# Patient Record
Sex: MALE | Race: WHITE
[De-identification: names, ages, dates, MRNs, and addresses within clinical notes are randomized per-mention and may not be internally consistent; named-entity substitution may affect disease eponyms.]

---

## 2020-09-09 ENCOUNTER — RESULT REVIEW (OUTPATIENT)
Age: 50
End: 2020-09-09

## 2020-12-14 PROBLEM — Z00.00 ENCOUNTER FOR PREVENTIVE HEALTH EXAMINATION: Status: ACTIVE | Noted: 2020-12-14

## 2020-12-15 ENCOUNTER — APPOINTMENT (OUTPATIENT)
Dept: UROLOGY | Facility: CLINIC | Age: 50
End: 2020-12-15
Payer: MEDICAID

## 2020-12-15 VITALS
HEIGHT: 68 IN | DIASTOLIC BLOOD PRESSURE: 91 MMHG | BODY MASS INDEX: 28.79 KG/M2 | WEIGHT: 190 LBS | SYSTOLIC BLOOD PRESSURE: 153 MMHG | TEMPERATURE: 97.7 F | HEART RATE: 86 BPM

## 2020-12-15 DIAGNOSIS — Z78.9 OTHER SPECIFIED HEALTH STATUS: ICD-10-CM

## 2020-12-15 DIAGNOSIS — N50.3 CYST OF EPIDIDYMIS: ICD-10-CM

## 2020-12-15 DIAGNOSIS — Z80.43 FAMILY HISTORY OF MALIGNANT NEOPLASM OF TESTIS: ICD-10-CM

## 2020-12-15 LAB
BILIRUB UR QL STRIP: NEGATIVE
CLARITY UR: CLEAR
COLLECTION METHOD: NORMAL
GLUCOSE UR-MCNC: NEGATIVE
HCG UR QL: 0.2 EU/DL
HGB UR QL STRIP.AUTO: NEGATIVE
KETONES UR-MCNC: NEGATIVE
LEUKOCYTE ESTERASE UR QL STRIP: NEGATIVE
NITRITE UR QL STRIP: NEGATIVE
PH UR STRIP: 6
PROT UR STRIP-MCNC: NEGATIVE
SP GR UR STRIP: <=1.005

## 2020-12-15 PROCEDURE — 99072 ADDL SUPL MATRL&STAF TM PHE: CPT

## 2020-12-15 PROCEDURE — 51798 US URINE CAPACITY MEASURE: CPT

## 2020-12-15 PROCEDURE — 99204 OFFICE O/P NEW MOD 45 MIN: CPT | Mod: 25

## 2020-12-15 PROCEDURE — 81003 URINALYSIS AUTO W/O SCOPE: CPT | Mod: QW

## 2020-12-15 NOTE — LETTER BODY
[Dear  ___] : Dear  [unfilled], [Consult Letter:] : I had the pleasure of evaluating your patient, [unfilled]. [( Thank you for referring [unfilled] for consultation for _____ )] : Thank you for referring [unfilled] for consultation for [unfilled] [Please see my note below.] : Please see my note below. [Consult Closing:] : Thank you very much for allowing me to participate in the care of this patient.  If you have any questions, please do not hesitate to contact me. [Sincerely,] : Sincerely, [FreeTextEntry3] : Aubrey Eller MD\par  of Urology\par [facility]\par \par Offices:\par [facility] @\par [address], [place] [address]\par and\par [address], [place] [address], [address]\par and\par [address] [address]\par \par TEL: [phone]\par FAX: [phone]

## 2020-12-15 NOTE — ASSESSMENT
[FreeTextEntry1] : Reviewed outside records. \par Right moderate complex Hydrocele and left epididymal cyst. \par \par Hydrocele:\par Discussed treatment options- continued observation, aspiration and surgical repair. Risks and benefits of each were discussed. Will like to observe. \par \par Epididymal cyst:\par Discussed that an epididymal cyst is a fluid-filled sac which grows at the top end of the testicle. It is benign and not malignant. Observation is usually used for simple, small asymptomatic spermatoceles and surgery for complex, large symptomatic ones. \par When symptomatic recommend:\par Scrotal support.\par NSAIDs PRN. \par \par Benign Prostatic Hyperplasia:\par PVR: 169 ml. \par Discussed that he has significant post void residual probably are secondary to enlarged prostate and that I would like to address those first with alpha blockers. Patient wants to hold off for now.\par \par Return to office in 2 months or sooner if any issues- will do Uroflo/PVR. \par

## 2020-12-15 NOTE — HISTORY OF PRESENT ILLNESS
[FreeTextEntry1] : 51 yo male presents for Hydrocele. \par Noticed right scrotal swelling for last 2 months, has off and on pain/discomfort. \par Had Scrotal Ultrasound: Hydrocele. Father had testicular cancer. \par Had pelvic CT scan yesterday. \par Reports reasonable stream, urinates every few hours or so during the day. Nocturia of 1 x. \par Denies hesitancy, straining, intermittency, urgency, incontinence, sense of incomplete emptying.\par Denies dysuria, hematuria, lower abdominal or flank pain, fever, chills or rigors.\par Normal erections and libido. \par No family history of Prostate cancer. \par

## 2020-12-15 NOTE — PHYSICAL EXAM
[General Appearance - Well Developed] : well developed [Normal Appearance] : normal appearance [General Appearance - In No Acute Distress] : no acute distress [] : no respiratory distress [Abdomen Soft] : soft [Abdomen Tenderness] : non-tender [Costovertebral Angle Tenderness] : no ~M costovertebral angle tenderness [Urethral Meatus] : meatus normal [Penis Abnormality] : normal circumcised penis [Prostate Tenderness] : the prostate was not tender [No Prostate Nodules] : no prostate nodules [Prostate Size ___ (0-4)] : prostate size [unfilled] (scale: 0-4) [FreeTextEntry1] : right hydrocele and left epididymal cyst  [Normal Station and Gait] : the gait and station were normal for the patient's age [Skin Color & Pigmentation] : normal skin color and pigmentation [No Focal Deficits] : no focal deficits [Oriented To Time, Place, And Person] : oriented to person, place, and time

## 2020-12-16 ENCOUNTER — NON-APPOINTMENT (OUTPATIENT)
Age: 50
End: 2020-12-16

## 2020-12-17 ENCOUNTER — NON-APPOINTMENT (OUTPATIENT)
Age: 50
End: 2020-12-17

## 2021-01-15 ENCOUNTER — APPOINTMENT (OUTPATIENT)
Dept: SURGERY | Facility: CLINIC | Age: 51
End: 2021-01-15
Payer: MEDICAID

## 2021-01-15 VITALS
HEIGHT: 67 IN | HEART RATE: 81 BPM | SYSTOLIC BLOOD PRESSURE: 137 MMHG | OXYGEN SATURATION: 97 % | RESPIRATION RATE: 16 BRPM | DIASTOLIC BLOOD PRESSURE: 86 MMHG | TEMPERATURE: 97.8 F | BODY MASS INDEX: 31.08 KG/M2 | WEIGHT: 198.03 LBS

## 2021-01-15 DIAGNOSIS — K40.90 UNILATERAL INGUINAL HERNIA, W/OUT OBSTRUCTION OR GANGRENE, NOT SPECIFIED AS RECURRENT: ICD-10-CM

## 2021-01-15 DIAGNOSIS — Z87.39 PERSONAL HISTORY OF OTHER DISEASES OF THE MUSCULOSKELETAL SYSTEM AND CONNECTIVE TISSUE: ICD-10-CM

## 2021-01-15 DIAGNOSIS — Z82.61 FAMILY HISTORY OF ARTHRITIS: ICD-10-CM

## 2021-01-15 DIAGNOSIS — Z80.43 FAMILY HISTORY OF MALIGNANT NEOPLASM OF TESTIS: ICD-10-CM

## 2021-01-15 PROCEDURE — 99204 OFFICE O/P NEW MOD 45 MIN: CPT

## 2021-01-15 PROCEDURE — 99072 ADDL SUPL MATRL&STAF TM PHE: CPT

## 2021-01-15 RX ORDER — IBUPROFEN 200 MG/1
200 TABLET ORAL
Refills: 0 | Status: ACTIVE | COMMUNITY

## 2021-01-15 NOTE — PHYSICAL EXAM
[JVD] : no jugular venous distention  [No Rash or Lesion] : No rash or lesion [Purpura] : no purpura  [Petechiae] : no petechiae [Skin Ulcer] : no ulcer [Skin Induration] : no induration [Alert] : alert [Oriented to Person] : oriented to person [Oriented to Place] : oriented to place [Oriented to Time] : oriented to time [Calm] : calm [de-identified] : non toxic, in no acute distress  [de-identified] : NC/AT PERRL EOMI no scleral icterus  [de-identified] : trachea midline, no gross mass  [de-identified] : no audible wheezing or stridor  [de-identified] : soft, non tender, no guarding, no rebound, no masses  [de-identified] : phallus normal, mild fullness without tenderness to the right testicle [de-identified] : FROM of all extremities with no gross deformity or angulation, there is no tenderness, no umbilical or left inguinal hernias, there is a reducible right inguinal hernia that does not appear to communicate with the right hydorcele [de-identified] : mood is calm

## 2021-01-15 NOTE — REASON FOR VISIT
[Consultation] : a consultation visit [FreeTextEntry1] : right groin pain and right inguinal hernia

## 2021-01-15 NOTE — HISTORY OF PRESENT ILLNESS
[de-identified] : The patient comes to the office in consultation by Dr. Aubrey Eller for evaluation of right groin pain and a right inguinal hernia with hydrocele.  The patient reports that he has been having intermittent discomfort and swelling in the right testicle.  The patient reports that more recently he has been having more pain in the right groin with a separate area of swelling.  He  has no abdominal pain, nausea, vomit, or changes in his bowel function.  The patient reports that he has been told he has a hydrocele based on ultrasound and on CT scan there was a right inguinal hernia.

## 2021-01-15 NOTE — DATA REVIEWED
[FreeTextEntry1] : Independent review of the scrotal ultrasound reveals a moderate complex right hydrocele and small let epididymal cyst. \par \par CT scan films not available for review

## 2021-01-15 NOTE — CONSULT LETTER
[Dear  ___] : Dear  [unfilled], [Consult Letter:] : I had the pleasure of evaluating your patient, [unfilled]. [Please see my note below.] : Please see my note below. [Consult Closing:] : Thank you very much for allowing me to participate in the care of this patient.  If you have any questions, please do not hesitate to contact me. [Sincerely,] : Sincerely, [FreeTextEntry3] : Tommy Marlow MD, FACS\par  \par Department of Surgery\par [facility]\par [facility]\par

## 2021-01-22 ENCOUNTER — APPOINTMENT (OUTPATIENT)
Dept: SURGERY | Facility: CLINIC | Age: 51
End: 2021-01-22

## 2021-02-12 ENCOUNTER — APPOINTMENT (OUTPATIENT)
Dept: SURGERY | Facility: CLINIC | Age: 51
End: 2021-02-12
Payer: MEDICAID

## 2021-02-12 PROCEDURE — 99214 OFFICE O/P EST MOD 30 MIN: CPT | Mod: 95

## 2021-02-12 NOTE — REASON FOR VISIT
[Home] : at home, [unfilled] , at the time of the visit. [Medical Office: ([facility])___] : at the medical office located in  [Verbal consent obtained from patient] : the patient, [unfilled] [Follow-Up: _____] : a [unfilled] follow-up visit

## 2021-02-12 NOTE — ASSESSMENT
[FreeTextEntry1] : The patient is a 50 year old male who was advised following review of the abd/pel CT scan films that he has a right hydrocele that appears to be non communicating with the right inguinal hernia.  He also has a smaller fat containing left inguinal hernia.  The patient has been advised that he will benefit from repair of the hernias.  The risks, benefits, and alternatives including the option of doing nothing to a robotic, possible, laparoscopic and possible open bilateral inguinal hernia repair with mesh were discussed.  The potential complications including but not limited to infection, bleeding, hernia recurrence, chronic post-operative pain, and seroma formation were discussed.  The patient was educated regarding the signs and symptoms of hernia strangulation and advised to seek immediate MD evaluation should this occur.  The patient understands and wishes to proceed at the next available time.\par

## 2021-02-16 ENCOUNTER — APPOINTMENT (OUTPATIENT)
Dept: UROLOGY | Facility: CLINIC | Age: 51
End: 2021-02-16

## 2021-02-23 ENCOUNTER — APPOINTMENT (OUTPATIENT)
Dept: UROLOGY | Facility: CLINIC | Age: 51
End: 2021-02-23
Payer: MEDICAID

## 2021-02-23 VITALS — TEMPERATURE: 97.7 F

## 2021-02-23 DIAGNOSIS — N13.8 BENIGN PROSTATIC HYPERPLASIA WITH LOWER URINARY TRACT SYMPMS: ICD-10-CM

## 2021-02-23 DIAGNOSIS — N40.1 BENIGN PROSTATIC HYPERPLASIA WITH LOWER URINARY TRACT SYMPMS: ICD-10-CM

## 2021-02-23 PROCEDURE — 51798 US URINE CAPACITY MEASURE: CPT | Mod: 59

## 2021-02-23 PROCEDURE — 99214 OFFICE O/P EST MOD 30 MIN: CPT | Mod: 25

## 2021-02-23 PROCEDURE — 51741 ELECTRO-UROFLOWMETRY FIRST: CPT

## 2021-02-23 PROCEDURE — 99072 ADDL SUPL MATRL&STAF TM PHE: CPT

## 2021-02-23 RX ORDER — TAMSULOSIN HYDROCHLORIDE 0.4 MG/1
0.4 CAPSULE ORAL
Qty: 30 | Refills: 3 | Status: ACTIVE | COMMUNITY
Start: 2021-02-23 | End: 1900-01-01

## 2021-02-25 NOTE — HISTORY OF PRESENT ILLNESS
[FreeTextEntry1] : 49 yo male presents for follow up. \par Has bilateral hernia, considering repair, saw Dr Marlow. Will like to observe hydrocele.\par Same urination. \par \par Initially seen for Hydrocele. Found to have elevated PVR. \par Noticed right scrotal swelling, had off and on pain/discomfort. \par Had Scrotal Ultrasound: Hydrocele. Father had testicular cancer. \par Had pelvic CT scan yesterday. \par Reported reasonable stream, urinates every few hours or so during the day. Nocturia of 1 x. \par Denied hesitancy, straining, intermittency, urgency, incontinence, sense of incomplete emptying.\par Denied dysuria, hematuria, lower abdominal or flank pain, fever, chills or rigors.\par Normal erections and libido. \par No family history of Prostate cancer. \par

## 2021-02-25 NOTE — PHYSICAL EXAM
[Normal Appearance] : normal appearance [General Appearance - In No Acute Distress] : no acute distress [Abdomen Soft] : soft [Abdomen Tenderness] : non-tender [Skin Color & Pigmentation] : normal skin color and pigmentation [FreeTextEntry1] : normal peripheral circulation  [] : no respiratory distress [Oriented To Time, Place, And Person] : oriented to person, place, and time [Normal Station and Gait] : the gait and station were normal for the patient's age

## 2021-02-25 NOTE — ASSESSMENT
[FreeTextEntry1] : Benign Prostatic Hyperplasia:\par See Uroflo and PVR. \par Prescribed Flomax. Explained common side effects: nasal congestion, cough, muscle pain, back pain, dizziness, orthostatic hypotension, somnolence, retrograde ejaculation, decreased libido and erectile dysfunction.\par  \par Hydrocele:\par Discussed treatment options- continued observation, aspiration and surgical repair. Risks and benefits of each were discussed. Pt wants to observe for now. \par \par Return to office in 4 weeks or sooner if any issues- will do Uroflo/PVR.

## 2021-02-25 NOTE — LETTER BODY
[Dear  ___] : Dear  [unfilled], [Courtesy Letter:] : I had the pleasure of seeing your patient, [unfilled], in my office today. [Please see my note below.] : Please see my note below. [Sincerely,] : Sincerely, [FreeTextEntry3] : Aubrey Eller MD\par  of Urology\par [facility]\par \par Offices:\par [facility] @\par [address], [place] [address]\par and\par [address], [place] [address], [address]\par and\par [address] [address]\par \par TEL: [phone]\par FAX: [phone]

## 2021-03-23 ENCOUNTER — APPOINTMENT (OUTPATIENT)
Dept: UROLOGY | Facility: CLINIC | Age: 51
End: 2021-03-23

## 2021-04-23 ENCOUNTER — OUTPATIENT (OUTPATIENT)
Dept: OUTPATIENT SERVICES | Facility: HOSPITAL | Age: 51
LOS: 1 days | End: 2021-04-23
Payer: MEDICAID

## 2021-04-23 VITALS
HEART RATE: 103 BPM | WEIGHT: 200.18 LBS | SYSTOLIC BLOOD PRESSURE: 140 MMHG | TEMPERATURE: 98 F | HEIGHT: 68 IN | DIASTOLIC BLOOD PRESSURE: 80 MMHG | RESPIRATION RATE: 16 BRPM

## 2021-04-23 DIAGNOSIS — Z98.890 OTHER SPECIFIED POSTPROCEDURAL STATES: Chronic | ICD-10-CM

## 2021-04-23 DIAGNOSIS — Z01.818 ENCOUNTER FOR OTHER PREPROCEDURAL EXAMINATION: ICD-10-CM

## 2021-04-23 DIAGNOSIS — Z29.9 ENCOUNTER FOR PROPHYLACTIC MEASURES, UNSPECIFIED: ICD-10-CM

## 2021-04-23 DIAGNOSIS — K40.20 BILATERAL INGUINAL HERNIA, WITHOUT OBSTRUCTION OR GANGRENE, NOT SPECIFIED AS RECURRENT: ICD-10-CM

## 2021-04-23 LAB
ALBUMIN SERPL ELPH-MCNC: 4.5 G/DL — SIGNIFICANT CHANGE UP (ref 3.3–5.2)
ALP SERPL-CCNC: 85 U/L — SIGNIFICANT CHANGE UP (ref 40–120)
ALT FLD-CCNC: 23 U/L — SIGNIFICANT CHANGE UP
ANION GAP SERPL CALC-SCNC: 9 MMOL/L — SIGNIFICANT CHANGE UP (ref 5–17)
APTT BLD: 29.8 SEC — SIGNIFICANT CHANGE UP (ref 27.5–35.5)
AST SERPL-CCNC: 21 U/L — SIGNIFICANT CHANGE UP
BASOPHILS # BLD AUTO: 0.04 K/UL — SIGNIFICANT CHANGE UP (ref 0–0.2)
BASOPHILS NFR BLD AUTO: 0.6 % — SIGNIFICANT CHANGE UP (ref 0–2)
BILIRUB SERPL-MCNC: 0.4 MG/DL — SIGNIFICANT CHANGE UP (ref 0.4–2)
BLD GP AB SCN SERPL QL: SIGNIFICANT CHANGE UP
BUN SERPL-MCNC: 20 MG/DL — SIGNIFICANT CHANGE UP (ref 8–20)
CALCIUM SERPL-MCNC: 9.1 MG/DL — SIGNIFICANT CHANGE UP (ref 8.6–10.2)
CHLORIDE SERPL-SCNC: 105 MMOL/L — SIGNIFICANT CHANGE UP (ref 98–107)
CO2 SERPL-SCNC: 28 MMOL/L — SIGNIFICANT CHANGE UP (ref 22–29)
CREAT SERPL-MCNC: 0.87 MG/DL — SIGNIFICANT CHANGE UP (ref 0.5–1.3)
EOSINOPHIL # BLD AUTO: 0.17 K/UL — SIGNIFICANT CHANGE UP (ref 0–0.5)
EOSINOPHIL NFR BLD AUTO: 2.7 % — SIGNIFICANT CHANGE UP (ref 0–6)
GLUCOSE SERPL-MCNC: 89 MG/DL — SIGNIFICANT CHANGE UP (ref 70–99)
HCT VFR BLD CALC: 44.6 % — SIGNIFICANT CHANGE UP (ref 39–50)
HGB BLD-MCNC: 15.4 G/DL — SIGNIFICANT CHANGE UP (ref 13–17)
IMM GRANULOCYTES NFR BLD AUTO: 0.2 % — SIGNIFICANT CHANGE UP (ref 0–1.5)
INR BLD: 1.07 RATIO — SIGNIFICANT CHANGE UP (ref 0.88–1.16)
LYMPHOCYTES # BLD AUTO: 1.24 K/UL — SIGNIFICANT CHANGE UP (ref 1–3.3)
LYMPHOCYTES # BLD AUTO: 19.9 % — SIGNIFICANT CHANGE UP (ref 13–44)
MCHC RBC-ENTMCNC: 31.3 PG — SIGNIFICANT CHANGE UP (ref 27–34)
MCHC RBC-ENTMCNC: 34.5 GM/DL — SIGNIFICANT CHANGE UP (ref 32–36)
MCV RBC AUTO: 90.7 FL — SIGNIFICANT CHANGE UP (ref 80–100)
MONOCYTES # BLD AUTO: 0.73 K/UL — SIGNIFICANT CHANGE UP (ref 0–0.9)
MONOCYTES NFR BLD AUTO: 11.7 % — SIGNIFICANT CHANGE UP (ref 2–14)
NEUTROPHILS # BLD AUTO: 4.04 K/UL — SIGNIFICANT CHANGE UP (ref 1.8–7.4)
NEUTROPHILS NFR BLD AUTO: 64.9 % — SIGNIFICANT CHANGE UP (ref 43–77)
PLATELET # BLD AUTO: 202 K/UL — SIGNIFICANT CHANGE UP (ref 150–400)
POTASSIUM SERPL-MCNC: 4.7 MMOL/L — SIGNIFICANT CHANGE UP (ref 3.5–5.3)
POTASSIUM SERPL-SCNC: 4.7 MMOL/L — SIGNIFICANT CHANGE UP (ref 3.5–5.3)
PROT SERPL-MCNC: 6.6 G/DL — SIGNIFICANT CHANGE UP (ref 6.6–8.7)
PROTHROM AB SERPL-ACNC: 12.4 SEC — SIGNIFICANT CHANGE UP (ref 10.6–13.6)
RBC # BLD: 4.92 M/UL — SIGNIFICANT CHANGE UP (ref 4.2–5.8)
RBC # FLD: 11.7 % — SIGNIFICANT CHANGE UP (ref 10.3–14.5)
SODIUM SERPL-SCNC: 142 MMOL/L — SIGNIFICANT CHANGE UP (ref 135–145)
WBC # BLD: 6.23 K/UL — SIGNIFICANT CHANGE UP (ref 3.8–10.5)
WBC # FLD AUTO: 6.23 K/UL — SIGNIFICANT CHANGE UP (ref 3.8–10.5)

## 2021-04-23 PROCEDURE — 93010 ELECTROCARDIOGRAM REPORT: CPT

## 2021-04-23 PROCEDURE — 93005 ELECTROCARDIOGRAM TRACING: CPT

## 2021-04-23 PROCEDURE — G0463: CPT

## 2021-04-23 RX ORDER — CEFAZOLIN SODIUM 1 G
2000 VIAL (EA) INJECTION ONCE
Refills: 0 | Status: COMPLETED | OUTPATIENT
Start: 2021-05-05 | End: 2021-05-05

## 2021-04-23 NOTE — H&P PST ADULT - ATTENDING COMMENTS
The risks, benefits, and alternatives including the option of doing nothing to a robotic, possible laparoscopic and possible open bilateral inguinal hernia repair with mesh were discussed.  The potential complications including but not limited to infection, bleeding, recurrent herniation, possible bowel injury and/or obstruction, seroma and/or hematoma, and possible chronic post operative pain were discussed.  The patient admitted understanding and agrees to proceed as planned.

## 2021-04-23 NOTE — H&P PST ADULT - NSICDXPASTMEDICALHX_GEN_ALL_CORE_FT
PAST MEDICAL HISTORY:  Bi inguinal hernia, w/o obst or gangrene, not spcf as recur      PAST MEDICAL HISTORY:  Bi inguinal hernia, w/o obst or gangrene, not spcf as recur     BPH (benign prostatic hyperplasia)     Right hydrocele

## 2021-04-23 NOTE — H&P PST ADULT - ASSESSMENT
49 yo M    OPIOID RISK TOOL    MAURICIO EACH BOX THAT APPLIES AND ADD TOTALS AT THE END    FAMILY HISTORY OF SUBSTANCE ABUSE                 FEMALE         MALE                                                Alcohol                             [  ]1 pt          [  ]3pts                                               Illegal Durgs                     [  ]2 pts        [  ]3pts                                               Rx Drugs                           [  ]4 pts        [  ]4 pts    PERSONAL HISTORY OF SUBSTANCE ABUSE                                                                                          Alcohol                             [  ]3 pts       [  ]3 pts                                               Illegal Drugs                     [  ]4 pts        [  ]4 pts                                               Rx Drugs                           [  ]5 pts        [  ]5 pts    AGE BETWEEN 16-45 YEARS                                      [  ]1 pt         [  ]1 pt    HISTORY OF PREADOLESCENT   SEXUAL ABUSE                                                             [  ]3 pts        [  ]0pts    PSYCHOLOGICAL DISEASE                     ADD, OCD, Bipolar, Schizophrenia        [  ]2 pts         [  ]2 pts                      Depression                                               [  ]1 pt           [  ]1 pt           SCORING TOTAL   (add numbers and type here)              ( 0 )                                     A score of 3 or lower indicated LOW risk for future opioid abuse  A score of 4 to 7 indicated moderate risk for future opioid abuse  A score of 8 or higher indicates a high risk for opioid abuse      CAPRINI VTE 2.0 SCORE [CLOT updated 2019]    AGE RELATED RISK FACTORS                                                       MOBILITY RELATED FACTORS  [ x] Age 41-60 years                                            (1 Point)                    [ ] Bed rest                                                        (1 Point)  [ ] Age: 61-74 years                                           (2 Points)                  [ ] Plaster cast                                                   (2 Points)  [ ] Age= 75 years                                              (3 Points)                    [ ] Bed bound for more than 72 hours                 (2 Points)    DISEASE RELATED RISK FACTORS                                               GENDER SPECIFIC FACTORS  [ ] Edema in the lower extremities                       (1 Point)              [ ] Pregnancy                                                     (1 Point)  [ ] Varicose veins                                               (1 Point)                     [ ] Post-partum < 6 weeks                                   (1 Point)             [ x] BMI > 25 Kg/m2                                            (1 Point)                     [ ] Hormonal therapy  or oral contraception          (1 Point)                 [ ] Sepsis (in the previous month)                        (1 Point)               [ ] History of pregnancy complications                 (1 point)  [ ] Pneumonia or serious lung disease                                               [ ] Unexplained or recurrent                     (1 Point)           (in the previous month)                               (1 Point)  [ ] Abnormal pulmonary function test                     (1 Point)                 SURGERY RELATED RISK FACTORS  [ ] Acute myocardial infarction                              (1 Point)               [ ]  Section                                             (1 Point)  [ ] Congestive heart failure (in the previous month)  (1 Point)      [ ] Minor surgery                                                  (1 Point)   [ ] Inflammatory bowel disease                             (1 Point)               [ ] Arthroscopic surgery                                        (2 Points)  [ ] Central venous access                                      (2 Points)                [x ] General surgery lasting more than 45 minutes (2 points)  [ ] Malignancy- Present or previous                   (2 Points)                [ ] Elective arthroplasty                                         (5 points)    [ ] Stroke (in the previous month)                          (5 Points)                                                                                                                                                           HEMATOLOGY RELATED FACTORS                                                 TRAUMA RELATED RISK FACTORS  [ ] Prior episodes of VTE                                     (3 Points)                [ ] Fracture of the hip, pelvis, or leg                       (5 Points)  [ ] Positive family history for VTE                         (3 Points)             [ ] Acute spinal cord injury (in the previous month)  (5 Points)  [ ] Prothrombin 99270 A                                     (3 Points)               [ ] Paralysis  (less than 1 month)                             (5 Points)  [ ] Factor V Leiden                                             (3 Points)                  [ ] Multiple Trauma within 1 month                        (5 Points)  [ ] Lupus anticoagulants                                     (3 Points)                                                           [ ] Anticardiolipin antibodies                               (3 Points)                                                       [ ] High homocysteine in the blood                      (3 Points)                                             [ ] Other congenital or acquired thrombophilia      (3 Points)                                                [ ] Heparin induced thrombocytopenia                  (3 Points)                                     Total Score [     4     ] 49 yo M PMH of BPH, c/o bilateral inguinal hernias for 1 yr. Pt noticed right scrotal bulging approximately 1 yr ago and now reports that the bulging has increased. He reports occasional discomfort in the right groin. Scrotal ultrasound showed right hydrocele. Abd/pel CT scan showed a right hydrocele that appears to be non communicating with the right inguinal hernia, and a smaller fat containing left inguinal hernia. Pt denies fevers, chills, abdominal or flank pain, hesitancy, straining, urgency, incontinence, feeling of incomplete emptying, dysuria, hematuria, or any bowel issues. He reports reasonable stream, urinates every few hours or so during the day, nocturia x1/night. FHx of testicular cancer in father, denies FHx of prostate cancer. Preop assessment prior to robotic assisted bilateral inguinal hernia repair with mesh, possible laparoscopic, possible open w/Dr Marlow on     OPIOID RISK TOOL    MAURICIO EACH BOX THAT APPLIES AND ADD TOTALS AT THE END    FAMILY HISTORY OF SUBSTANCE ABUSE                 FEMALE         MALE                                                Alcohol                             [  ]1 pt          [  ]3pts                                               Illegal Durgs                     [  ]2 pts        [  ]3pts                                               Rx Drugs                           [  ]4 pts        [  ]4 pts    PERSONAL HISTORY OF SUBSTANCE ABUSE                                                                                          Alcohol                             [  ]3 pts       [  ]3 pts                                               Illegal Drugs                     [  ]4 pts        [  ]4 pts                                               Rx Drugs                           [  ]5 pts        [  ]5 pts    AGE BETWEEN 16-45 YEARS                                      [  ]1 pt         [  ]1 pt    HISTORY OF PREADOLESCENT   SEXUAL ABUSE                                                             [  ]3 pts        [  ]0pts    PSYCHOLOGICAL DISEASE                     ADD, OCD, Bipolar, Schizophrenia        [  ]2 pts         [  ]2 pts                      Depression                                               [  ]1 pt           [  ]1 pt           SCORING TOTAL   (add numbers and type here)              ( 0 )                                     A score of 3 or lower indicated LOW risk for future opioid abuse  A score of 4 to 7 indicated moderate risk for future opioid abuse  A score of 8 or higher indicates a high risk for opioid abuse      CAPRINI VTE 2.0 SCORE [CLOT updated 2019]    AGE RELATED RISK FACTORS                                                       MOBILITY RELATED FACTORS  [ x] Age 41-60 years                                            (1 Point)                    [ ] Bed rest                                                        (1 Point)  [ ] Age: 61-74 years                                           (2 Points)                  [ ] Plaster cast                                                   (2 Points)  [ ] Age= 75 years                                              (3 Points)                    [ ] Bed bound for more than 72 hours                 (2 Points)    DISEASE RELATED RISK FACTORS                                               GENDER SPECIFIC FACTORS  [ ] Edema in the lower extremities                       (1 Point)              [ ] Pregnancy                                                     (1 Point)  [ ] Varicose veins                                               (1 Point)                     [ ] Post-partum < 6 weeks                                   (1 Point)             [ x] BMI > 25 Kg/m2                                            (1 Point)                     [ ] Hormonal therapy  or oral contraception          (1 Point)                 [ ] Sepsis (in the previous month)                        (1 Point)               [ ] History of pregnancy complications                 (1 point)  [ ] Pneumonia or serious lung disease                                               [ ] Unexplained or recurrent                     (1 Point)           (in the previous month)                               (1 Point)  [ ] Abnormal pulmonary function test                     (1 Point)                 SURGERY RELATED RISK FACTORS  [ ] Acute myocardial infarction                              (1 Point)               [ ]  Section                                             (1 Point)  [ ] Congestive heart failure (in the previous month)  (1 Point)      [ ] Minor surgery                                                  (1 Point)   [ ] Inflammatory bowel disease                             (1 Point)               [ ] Arthroscopic surgery                                        (2 Points)  [ ] Central venous access                                      (2 Points)                [x ] General surgery lasting more than 45 minutes (2 points)  [ ] Malignancy- Present or previous                   (2 Points)                [ ] Elective arthroplasty                                         (5 points)    [ ] Stroke (in the previous month)                          (5 Points)                                                                                                                                                           HEMATOLOGY RELATED FACTORS                                                 TRAUMA RELATED RISK FACTORS  [ ] Prior episodes of VTE                                     (3 Points)                [ ] Fracture of the hip, pelvis, or leg                       (5 Points)  [ ] Positive family history for VTE                         (3 Points)             [ ] Acute spinal cord injury (in the previous month)  (5 Points)  [ ] Prothrombin 87701 A                                     (3 Points)               [ ] Paralysis  (less than 1 month)                             (5 Points)  [ ] Factor V Leiden                                             (3 Points)                  [ ] Multiple Trauma within 1 month                        (5 Points)  [ ] Lupus anticoagulants                                     (3 Points)                                                           [ ] Anticardiolipin antibodies                               (3 Points)                                                       [ ] High homocysteine in the blood                      (3 Points)                                             [ ] Other congenital or acquired thrombophilia      (3 Points)                                                [ ] Heparin induced thrombocytopenia                  (3 Points)                                     Total Score [     4     ]

## 2021-04-23 NOTE — H&P PST ADULT - HISTORY OF PRESENT ILLNESS
49 yo M c/o bilateral groin hernias for 1 yr, right worse>left 51 yo M c/o bilateral groin hernias for 1 yr, right worse>lHas bilateral hernia, considering repair, saw Dr Marlow. Will like to observe hydrocele.  Same urination.     Initially seen for Hydrocele. Found to have elevated PVR.   Noticed right scrotal swelling, had off and on pain/discomfort.   Had Scrotal Ultrasound: Hydrocele. Father had testicular cancer.   Had pelvic CT scan yesterday.   Reported reasonable stream, urinates every few hours or so during the day. Nocturia of 1 x.   Denied hesitancy, straining, intermittency, urgency, incontinence, sense of incomplete emptying.  Denied dysuria, hematuria, lower abdominal or flank pain, fever, chills or rigors.  Normal erections and libido.   No family history of Prostate cancer.  51 yo M c/o bilateral groin hernias for 1 yr.   50 year old male being seen for a right inguinal hernia and right hydrocele follow-up visit.     Active Problems  Bilateral inguinal hernia (550.92) (K40.20)  BPH with obstruction/lower urinary tract symptoms (600.01,599.69) (N40.1,N13.8)  Epididymal cyst (608.    Has bilateral hernia, considering repair, saw Dr Marlow. Will like to observe hydrocele.  Same urination.     Initially seen for Hydrocele. Found to have elevated PVR.   Noticed right scrotal swelling, had off and on pain/discomfort.   Had Scrotal Ultrasound: Hydrocele. Father had testicular cancer.   Had pelvic CT scan yesterday.   Reported reasonable stream, urinates every few hours or so during the day. Nocturia of 1 x.   Denied hesitancy, straining, intermittency, urgency, incontinence, sense of incomplete emptying.  Denied dysuria, hematuria, lower abdominal or flank pain, fever, chills or rigors.  Normal erections and libido.   No family history of Prostate cancer.  51 yo M PMH of BPH, c/o bilateral inguinal hernias for 1 yr. Pt noticed right scrotal swelling approx 1 yr ago, has occasional discomfort. Scrotal ultrasound showed right hydrocele. Pelvic CT scan showed. Pt denies hesitancy, straining, urgency, incontinence, or feeling of incomplete emptying, dysuria, hematuria, lower abdominal or flank pain, fever, chills or rigors. He reports reasonable stream, urinates every few hours or so during the day, nocturia x1/night. FHx of testicular cancer in father, denies FHx of prostate cancer. Preop assessment prior to robotic assisted bilateral inguinal hernia repair with mesh, possible laparoscopic, possible open w/Dr Marlow on 5/5   51 yo M PMH of BPH, c/o bilateral inguinal hernias for 1 yr. Pt noticed right scrotal bulging approximately 1 yr ago and now reports that the bulging has increased. He reports occasional discomfort in the right groin. Scrotal ultrasound showed right hydrocele. Abd/pel CT scan showed a right hydrocele that appears to be non communicating with the right inguinal hernia, and a smaller fat containing left inguinal hernia. Pt denies fevers, chills, abdominal or flank pain, hesitancy, straining, urgency, incontinence, feeling of incomplete emptying, dysuria, hematuria, or any bowel issues. He reports reasonable stream, urinates every few hours or so during the day, nocturia x1/night. FHx of testicular cancer in father, denies FHx of prostate cancer. Preop assessment prior to robotic assisted bilateral inguinal hernia repair with mesh, possible laparoscopic, possible open w/Dr Marlow on 5/5

## 2021-04-23 NOTE — H&P PST ADULT - NSICDXPROBLEM_GEN_ALL_CORE_FT
PROBLEM DIAGNOSES  Problem: Bi inguinal hernia, w/o obst or gangrene, not spcf as recur  Assessment and Plan: preop assessment, bilateral inguinal hernia repair with mesh on 5/5    Problem: Need for prophylactic measure  Assessment and Plan: caprini score 4, moderate risk for dvt, SCD ordered, surgical team to assess for dvt prophylaxis

## 2021-04-23 NOTE — H&P PST ADULT - NEGATIVE GENERAL GENITOURINARY SYMPTOMS
no hematuria/no flank pain L/no flank pain R/no bladder infections/no incontinence/no dysuria/no urinary hesitancy/normal urinary frequency

## 2021-04-23 NOTE — H&P PST ADULT - GIT ABD PE PAL DETAILS PC
b/l inguinal reducible hernias, right>left/mass palpable right inguinal reducible hernia/mass palpable

## 2021-04-23 NOTE — H&P PST ADULT - LAB RESULTS AND INTERPRETATION
4/26: +MSSA, surgeon's office (Artie) made aware, mupirocin ordered, will educate patient on its use. Nicolette Marte NP 4/26: +MSSA, surgeon's office (Artie) made aware, mupirocin ordered, patient was educated on its use. Nicolette Marte NP

## 2021-04-23 NOTE — H&P PST ADULT - NSICDXFAMILYHX_GEN_ALL_CORE_FT
FAMILY HISTORY:  Mother  Still living? Yes, Estimated age: Age Unknown  FH: type 2 diabetes, Age at diagnosis: Age Unknown     FAMILY HISTORY:  Father  Still living? Unknown  FH: testicular cancer, Age at diagnosis: Age Unknown    Mother  Still living? Yes, Estimated age: Age Unknown  FH: type 2 diabetes, Age at diagnosis: Age Unknown

## 2021-04-23 NOTE — H&P PST ADULT - OTHER CARE PROVIDERS
PCP Dr Timothy Martinez [facility] PCP Dr Alana Martinez [facility]

## 2021-04-24 LAB
MRSA PCR RESULT.: SIGNIFICANT CHANGE UP
S AUREUS DNA NOSE QL NAA+PROBE: DETECTED

## 2021-04-25 PROBLEM — N40.0 BENIGN PROSTATIC HYPERPLASIA WITHOUT LOWER URINARY TRACT SYMPTOMS: Chronic | Status: ACTIVE | Noted: 2021-04-23

## 2021-04-25 PROBLEM — K40.20 BILATERAL INGUINAL HERNIA, WITHOUT OBSTRUCTION OR GANGRENE, NOT SPECIFIED AS RECURRENT: Chronic | Status: ACTIVE | Noted: 2021-04-23

## 2021-04-25 PROBLEM — N43.3 HYDROCELE, UNSPECIFIED: Chronic | Status: ACTIVE | Noted: 2021-04-23

## 2021-05-01 DIAGNOSIS — Z01.818 ENCOUNTER FOR OTHER PREPROCEDURAL EXAMINATION: ICD-10-CM

## 2021-05-02 ENCOUNTER — APPOINTMENT (OUTPATIENT)
Dept: DISASTER EMERGENCY | Facility: CLINIC | Age: 51
End: 2021-05-02

## 2021-05-02 LAB — SARS-COV-2 N GENE NPH QL NAA+PROBE: NOT DETECTED

## 2021-05-04 ENCOUNTER — TRANSCRIPTION ENCOUNTER (OUTPATIENT)
Age: 51
End: 2021-05-04

## 2021-05-05 ENCOUNTER — OUTPATIENT (OUTPATIENT)
Dept: INPATIENT UNIT | Facility: HOSPITAL | Age: 51
LOS: 1 days | End: 2021-05-05
Payer: MEDICAID

## 2021-05-05 ENCOUNTER — APPOINTMENT (OUTPATIENT)
Dept: SURGERY | Facility: HOSPITAL | Age: 51
End: 2021-05-05

## 2021-05-05 VITALS
HEART RATE: 69 BPM | WEIGHT: 200.18 LBS | TEMPERATURE: 98 F | SYSTOLIC BLOOD PRESSURE: 139 MMHG | DIASTOLIC BLOOD PRESSURE: 90 MMHG | OXYGEN SATURATION: 99 % | RESPIRATION RATE: 20 BRPM | HEIGHT: 68 IN

## 2021-05-05 VITALS
OXYGEN SATURATION: 99 % | RESPIRATION RATE: 15 BRPM | DIASTOLIC BLOOD PRESSURE: 85 MMHG | HEART RATE: 61 BPM | SYSTOLIC BLOOD PRESSURE: 127 MMHG

## 2021-05-05 DIAGNOSIS — Z98.890 OTHER SPECIFIED POSTPROCEDURAL STATES: Chronic | ICD-10-CM

## 2021-05-05 DIAGNOSIS — K40.20 BILATERAL INGUINAL HERNIA, WITHOUT OBSTRUCTION OR GANGRENE, NOT SPECIFIED AS RECURRENT: ICD-10-CM

## 2021-05-05 LAB — GLUCOSE BLDC GLUCOMTR-MCNC: 105 MG/DL — HIGH (ref 70–99)

## 2021-05-05 PROCEDURE — S2900: CPT

## 2021-05-05 PROCEDURE — 49650 LAP ING HERNIA REPAIR INIT: CPT | Mod: 82,50

## 2021-05-05 PROCEDURE — 82962 GLUCOSE BLOOD TEST: CPT

## 2021-05-05 PROCEDURE — 49650 LAP ING HERNIA REPAIR INIT: CPT | Mod: 50

## 2021-05-05 PROCEDURE — S2900 ROBOTIC SURGICAL SYSTEM: CPT | Mod: NC

## 2021-05-05 PROCEDURE — C1781: CPT

## 2021-05-05 RX ORDER — CELECOXIB 200 MG/1
1 CAPSULE ORAL
Qty: 14 | Refills: 0
Start: 2021-05-05 | End: 2021-05-11

## 2021-05-05 RX ORDER — SODIUM CHLORIDE 9 MG/ML
3 INJECTION INTRAMUSCULAR; INTRAVENOUS; SUBCUTANEOUS ONCE
Refills: 0 | Status: COMPLETED | OUTPATIENT
Start: 2021-05-05 | End: 2021-05-05

## 2021-05-05 RX ORDER — ONDANSETRON 8 MG/1
4 TABLET, FILM COATED ORAL ONCE
Refills: 0 | Status: DISCONTINUED | OUTPATIENT
Start: 2021-05-05 | End: 2021-05-05

## 2021-05-05 RX ORDER — SODIUM CHLORIDE 9 MG/ML
1000 INJECTION, SOLUTION INTRAVENOUS
Refills: 0 | Status: DISCONTINUED | OUTPATIENT
Start: 2021-05-05 | End: 2021-05-05

## 2021-05-05 RX ORDER — HYDROMORPHONE HYDROCHLORIDE 2 MG/ML
0.5 INJECTION INTRAMUSCULAR; INTRAVENOUS; SUBCUTANEOUS
Refills: 0 | Status: DISCONTINUED | OUTPATIENT
Start: 2021-05-05 | End: 2021-05-05

## 2021-05-05 RX ORDER — BUPIVACAINE 13.3 MG/ML
20 INJECTION, SUSPENSION, LIPOSOMAL INFILTRATION ONCE
Refills: 0 | Status: DISCONTINUED | OUTPATIENT
Start: 2021-05-05 | End: 2021-05-05

## 2021-05-05 RX ADMIN — Medication 100 MILLIGRAM(S): at 15:20

## 2021-05-05 RX ADMIN — SODIUM CHLORIDE 3 MILLILITER(S): 9 INJECTION INTRAMUSCULAR; INTRAVENOUS; SUBCUTANEOUS at 13:28

## 2021-05-05 NOTE — ASU DISCHARGE PLAN (ADULT/PEDIATRIC) - ASU DC SPECIAL INSTRUCTIONSFT
-follow up in clinic with Dr. Marlow in 2 weeks  - no showering for 48 hours  - okay to shower after 48 hours with soap and water - do not submerge  - for pain, take over the counter tylenol as instructed on packaging  - for breakthrough pain, take celebrex as prescribed -follow up in clinic with Dr. Marlow in 2 weeks  - no showering for 48 hours  - okay to shower after 48 hours with soap and water - do not submerge  - for pain, take over the counter Advil 200 mg (1 tab) after breakfast, lunch, and dinner   - for breakthrough pain, take celebrex as prescribed

## 2021-05-05 NOTE — ASU DISCHARGE PLAN (ADULT/PEDIATRIC) - CARE PROVIDER_API CALL
Tommy Marlow (MD)  Surgery  [address], [address]st [place] [address]  Phone: [phone]  Fax: [phone]  Follow Up Time: 2 weeks

## 2021-05-05 NOTE — ASU DISCHARGE PLAN (ADULT/PEDIATRIC) - ACTIVITY LEVEL
No heavy lifting max lifting capacity 20 pounds/No excercise/No heavy lifting/No sports/gym/No tub baths/No intercourse

## 2021-05-05 NOTE — BRIEF OPERATIVE NOTE - OPERATION/FINDINGS
Robot assisted b/l Inguinal hernia repair with mesh placed in preperitoneal space. Peritoneum closed with running barbed, non absorbable suture.

## 2021-05-05 NOTE — ASU DISCHARGE PLAN (ADULT/PEDIATRIC) - COMMENTS
Please call the office at [phone] for an appointment for a post operative appointment for Monday May 10, 2021.

## 2021-05-10 ENCOUNTER — APPOINTMENT (OUTPATIENT)
Dept: SURGERY | Facility: CLINIC | Age: 51
End: 2021-05-10
Payer: MEDICAID

## 2021-05-10 VITALS
HEART RATE: 77 BPM | TEMPERATURE: 97.6 F | BODY MASS INDEX: 30.76 KG/M2 | SYSTOLIC BLOOD PRESSURE: 156 MMHG | RESPIRATION RATE: 16 BRPM | HEIGHT: 67 IN | DIASTOLIC BLOOD PRESSURE: 92 MMHG | OXYGEN SATURATION: 99 % | WEIGHT: 196 LBS

## 2021-05-10 PROCEDURE — 99024 POSTOP FOLLOW-UP VISIT: CPT

## 2021-05-10 NOTE — ASSESSMENT
[FreeTextEntry1] : The patient is a 50 year old prince who is stable and doing well following a robotic bilateral inguinal hernia repair with mesh.  The patient was advised to avoid heavy or strenuous lifting and to utilize Ibuprofen as an anti-inflammatory.  The patient will follow up in 2 weeks time or sooner should any problems or issues arise.

## 2021-05-10 NOTE — PHYSICAL EXAM
[JVD] : no jugular venous distention  [No Rash or Lesion] : No rash or lesion [Purpura] : no purpura  [Petechiae] : no petechiae [Skin Ulcer] : no ulcer [Skin Induration] : no induration [Alert] : alert [Oriented to Person] : oriented to person [Oriented to Place] : oriented to place [Oriented to Time] : oriented to time [Calm] : calm [de-identified] : non toxic, in no acute distress  [de-identified] : NC/AT PERRL EOMI no scleral icterus  [de-identified] : trachea midline, no gross mass  [de-identified] : no audible wheezing or stridor  [de-identified] : softly distended, no localizing tenderness, no guarding, no rebound, no masses  [de-identified] : phallus normal, mild fullness without tenderness to the right testicle [de-identified] : FROM of all extremities with no gross deformity or angulation, there is no tenderness, no umbilical no recurrent inguinal hernias and no seroma or hematoma [de-identified] : surgical incisions are without invasive infection, there is mild inflammation of the right upper and left upper incision sites  [de-identified] : mood is calm

## 2021-05-10 NOTE — HISTORY OF PRESENT ILLNESS
[de-identified] : The patient returns to the office with complaints of mild bloating and mild lower abdominal wall discomfort. He was doing some yard work over the weekend and reports that the pain became more prominent thereafter.   The patient reports that the right groin pain he had prior to surgery has resolved.  Overall he is pleased with his results thus far.

## 2021-05-24 ENCOUNTER — APPOINTMENT (OUTPATIENT)
Dept: SURGERY | Facility: CLINIC | Age: 51
End: 2021-05-24
Payer: MEDICAID

## 2021-05-24 VITALS
HEIGHT: 67 IN | WEIGHT: 202 LBS | HEART RATE: 76 BPM | SYSTOLIC BLOOD PRESSURE: 151 MMHG | TEMPERATURE: 97.9 F | DIASTOLIC BLOOD PRESSURE: 94 MMHG | OXYGEN SATURATION: 97 % | BODY MASS INDEX: 31.71 KG/M2

## 2021-05-24 VITALS — SYSTOLIC BLOOD PRESSURE: 151 MMHG | DIASTOLIC BLOOD PRESSURE: 94 MMHG

## 2021-05-24 DIAGNOSIS — K40.20 BILATERAL INGUINAL HERNIA, W/OUT OBSTRUCTION OR GANGRENE, NOT SPECIFIED AS RECURRENT: ICD-10-CM

## 2021-05-24 PROCEDURE — 99024 POSTOP FOLLOW-UP VISIT: CPT

## 2021-05-24 NOTE — PHYSICAL EXAM
[JVD] : no jugular venous distention  [No Rash or Lesion] : No rash or lesion [Purpura] : no purpura  [Petechiae] : no petechiae [Skin Ulcer] : no ulcer [Skin Induration] : no induration [Alert] : alert [Oriented to Person] : oriented to person [Oriented to Place] : oriented to place [Oriented to Time] : oriented to time [Calm] : calm [de-identified] : non toxic, in no acute distress  [de-identified] : NC/AT PERRL EOMI no scleral icterus  [de-identified] : trachea midline, no gross mass  [de-identified] : no audible wheezing or stridor  [de-identified] : phallus normal, mild fullness without tenderness to the right testicle [de-identified] : softly distended, no localizing tenderness, no guarding, no rebound, no masses  [de-identified] : surgical incisions are without invasive infection, there remains mild inflammation of the right upper site, the left has healed  [de-identified] : FROM of all extremities with no gross deformity or angulation, there is no tenderness, no umbilical no recurrent inguinal hernias and no seroma or hematoma [de-identified] : mood is calm

## 2021-05-24 NOTE — HISTORY OF PRESENT ILLNESS
[de-identified] : The patient returns to the office without complaints. He is very pleased thus far with his surgical results.

## 2021-05-24 NOTE — ASSESSMENT
[FreeTextEntry1] : The patient is stable and doing well following a robotic assisted bilateral inguinal hernia repair with mesh.  The patient will follow up in 12 months or sooner should any issues or problems arise.

## 2021-10-19 ENCOUNTER — APPOINTMENT (OUTPATIENT)
Dept: NEUROSURGERY | Facility: CLINIC | Age: 51
End: 2021-10-19
Payer: MEDICAID

## 2021-10-19 VITALS — BODY MASS INDEX: 31.07 KG/M2 | WEIGHT: 205 LBS | HEIGHT: 68 IN

## 2021-10-19 DIAGNOSIS — M54.12 RADICULOPATHY, CERVICAL REGION: ICD-10-CM

## 2021-10-19 PROCEDURE — 99203 OFFICE O/P NEW LOW 30 MIN: CPT

## 2021-10-21 PROBLEM — M54.12 RIGHT CERVICAL RADICULOPATHY: Status: ACTIVE | Noted: 2021-10-19

## 2021-10-21 NOTE — PHYSICAL EXAM
[General Appearance - Alert] : alert [General Appearance - In No Acute Distress] : in no acute distress [Oriented To Time, Place, And Person] : oriented to person, place, and time [Impaired Insight] : insight and judgment were intact [Affect] : the affect was normal [Person] : oriented to person [Place] : oriented to place [Time] : oriented to time [Short Term Intact] : short term memory intact [Fluency] : fluency intact [Comprehension] : comprehension intact [Cranial Nerves Optic (II)] : visual acuity intact bilaterally,  pupils equal round and reactive to light [Cranial Nerves Oculomotor (III)] : extraocular motion intact [Cranial Nerves Trigeminal (V)] : facial sensation intact symmetrically [Cranial Nerves Facial (VII)] : face symmetrical [Cranial Nerves Glossopharyngeal (IX)] : tongue and palate midline [Cranial Nerves Accessory (XI - Cranial And Spinal)] : head turning and shoulder shrug symmetric [Cranial Nerves Hypoglossal (XII)] : there was no tongue deviation with protrusion [Motor Tone] : muscle tone was normal in all four extremities [Motor Strength] : muscle strength was normal in all four extremities [Sensation Tactile Decrease] : light touch was intact [Sensation Pain / Temperature Decrease] : pain and temperature was intact [Abnormal Walk] : normal gait [Balance] : balance was intact [No Visual Abnormalities] : no visible abnormailities [Normal] : normal [Able to toe walk] : the patient was able to toe walk [Able to heel walk] : the patient was able to heel walk [Over the Past 2 Weeks, Have You Felt Down, Depressed, or Hopeless?] : 1.) Over the past 2 weeks, have you felt down, depressed, or hopeless? No [Over the Past 2 Weeks, Have You Felt Little Interest or Pleasure Doing Things?] : 2.) Over the past 2 weeks, have you felt little interest or pleasure doing things? No

## 2021-10-21 NOTE — HISTORY OF PRESENT ILLNESS
[Pain] : pain [Weakness] : weakness [Intermit.] : ~He/She~ states the symptoms seem to be intermittent [Bending] : worsened by bending [Lifting] : worsened by lifting [Recumbency] : relieved by recumbency [Rest] : relieved by rest [Loss of Dexterity] : loss of dexterity [FreeTextEntry1] : neck and RUE pain.  [de-identified] : AMI LOYOLA is a 51 year old male presents for initial neurosurgical evaluation of right cervical radiculopathy.  No significant medical history.  Past surgical history includes carpal tunnel surgery right hand  10 years ago. Patient mentions long standing history of neck pain dating back 10 + years.  Denies any trauma or injury.  Patient is a , operating power tools and heavy lifting.  Patient endorses neck/interscapular pain with radiation to right upper extremity mostly affecting 4th and 5 th digit.  No LUE pain. RUE > neck pain.    Pain intensity 6/10.  Denies any saddle anesthesia, urinary or bowel incontinence.\par Patient is ambulating independently. No balance or coordination issues.  Patient is exacerbated by bending, twisting and lifting.  It is improved with modification of activity.   He has not tried any physical therapy or pain management as of yet.  No recent UE EMG/NCS \par He is managing his symptoms with cyclobenzaprine and NSAIDS which provides minimal relief. \par  [Ataxia] : no ataxia [Incontinence] : no incontinence [Urinary Ret.] : no urinary retention

## 2021-10-21 NOTE — REVIEW OF SYSTEMS
[As Noted in HPI] : as noted in HPI [Numbness] : numbness [Tingling] : tingling [Negative] : Heme/Lymph [Difficulty Walking] : no difficulty walking [FreeTextEntry9] : neck pain

## 2021-10-21 NOTE — REASON FOR VISIT
[New Patient Visit] : a new patient visit [Referred By: _________] : Patient was referred by SAMANTA [FreeTextEntry1] : right cervical radiculopathy  cervical spinal stenosis

## 2021-10-21 NOTE — ASSESSMENT
ESTÃ PROGRAMADO PARA UN EGD CON: Francesca Pickard MD    DÃ­a de la socorro: [phone]  LocalizaciÃ³n:  [address]  [address] [facility] [place] [address] [facility]    NÃºmero de telÃ©fono: [phone] para reprogramar  [phone] si tiene preguntas sobre la preparaciÃ³n o el procedimiento  [phone] despuÃ©s de las 5 p.m. para llamadas de emergencia    Hora de llegada: Las preadmisiones lo llamarÃ¡n karen o dos dÃ­as antes de la fecha de [place] procedimiento con mandujano hora de llegada. La prueba de COVID 19 estÃ¡ programada para las  [place] [address] [facility] [address] el [phone] a las 10am. Esta es monica prueba de laboratorio programada, asÃ­ que llegue 15 minutos antes de la hora indicada anteriormente. CUARENTENA EN MANDUJANO CASA HASTA DESPUÃS DEL [facility]. Si esto no es posible, le recomendamos que use Melissa Hutch, distancia social y practique monica buena higiene de lavado de alvarez antes de mandujano procedimiento. 1.La noche anterior a mandujano prueba, no ingiera nada sÃ³lido despuÃ©s de la medianoche, incluyendo antiÃ¡cidos, natali puede jn lÃ­quidos james hasta 4 horas antes de la hora de llegada luego, no toan nada. Entre los lÃ­quidos james se encuentran: a. Refrescos (orange, ginger-all, cola, Sprite. 7-Up, etc.). Gatorade, Michael-Aid, jugos de frutas colados sin pulpa (manzana, uva sabrina, naranja, limonada, etc.), agua, tÃ©, cafÃ© (sin leche o crema no lÃ¡ctea), consomÃ©/caldo bajo en sal, caramelos duros, paletas (no sorbete ni paletas con fruta), gelatina colin(limÃ³n, lima, naranja: sin cubierta de frutas). b.NO COMA ALIMENTOS SÃLIDOS. c.NO TOME BEBIDAS ALCOHÃLICAS. 2.No consuma gomas de mascar, mentas ni caramelos duros (4) horas antes de mandujano llegada. 3.Si por lo general bert medicamentos para el corazÃ³n o la presiÃ³n arterial en la maÃ±agustin, tÃ³melos en la maÃ±agustin de mandujano procedimiento con un pequeÃ±o sorbo de agua al menos (2) horas antes de mandujano llegada programada. 4. PÃ­walt a un amigo o familiar que lo lleve de [FreeTextEntry1] : Mr. Cuello presents with above history and imaging.  Patient is non focal, no neurological deficits noted on exam.  Patient has MRI cervical spine imaging which reveals central C3- C4 foraminal stenosis, left C5- C6 foraminal stenosis and right C6- C7 foraminal stenosis.  No severe cord compression.\par Patient has not exhausted any conservative measures as of yet.\par \par Antiinflammatory was recommended for management of symptoms and pain. Patient has been referred to physical therapy for strengthening and to decrease pain modalities and core strengthening.  We discussed the benefits of alternating heat, ice  and Icy Hot Cream.  Patient  may try gentle stretches at home in the interim.  Patient will follow up in 4-6 weeks for a formal clinical assessment and evaluate recovery.\par \par I, Dr. Osmar Chavez, personally performed the evaluation and management (E/M) services for this patient.  That E/M includes assessment of the initial examination, assessing the pertinent medical and surgical history, and establishing the plan of care.  At the time of the visit, my ACP, Jamila Cm, actively participated in the evaluation and management services for this patient to be followed going forward in accordance with the plan documented in the clinical note.\par \par \par  regreso a casa. La sedaciÃ³n no le permitirÃ¡ manejar hans aproximadamente 24 horas despuÃ©s de mandujano procedimientoohasta que tenga monica noche de descanso reparador. No podrÃ¡ utilizar el transporte pÃºblico.    5.Instrucciones especiales: 6. Si tiene alguna pregunta o problema con estas instrucciones, llame a mandujano mÃ©dico gastrointestinal en el dÃ­a o la noche al [phone]. 7.Si shanice resultados son anormales, recibirÃ¡ monica llamada telefÃ³delmy del consultorio de mandujano mÃ©dico. En leena contrario, recibirÃ¡ monica carta de mandujano mÃ©dico en 10 a 14 dÃ­as, la cual resume shanice DORROUGHBY. 8.Si tiene monica socorro  2 a 3 semanas despuÃ©s de mandujano prueba, recibirÃ¡ [employer] en blanca visita al [employer]. ENDOSCOPIA GASTROINTESTINAL DE LAS VÃAS DIGESTIVAS ALTAS (EGD)   DespuÃ©s de monica evaluaciÃ³n mÃ©dica cuidadosa, mandujano mÃ©dico montelongo recomendado que se mary monica esofagogastroduodenoscopia, tambiÃ©n conocida jordan monica EGD. Hans monica EGD, se pasa un tubo de fibra Ã³ptica flexible por la boca, el esÃ³fago y [employer] y la primera parte del intestino mondragon (duodeno). El instrumento principal que se [place] para mirar dentro del esÃ³fago y el estÃ³korey es el endoscopio, que es un tubo jung y flexible con monica pequeÃ±a cÃ¡jaciel de video y Sharlynn Friar en el extremo. Al ajustar los distintos controles del endoscopio, el gastroenterÃ³logo puede guiar con cuidado el instrumento hacia cualquier direcciÃ³n para observar el interior del esÃ³fago y [place]. La imagen de viky calidad del endoscopio se muestra en monica pantalla del televisor y proporciona monica vista bolivar y detallada. Se iniciarÃ¡ monica inyecciÃ³n intravenosa (IV) antes del procedimiento. Se administrarÃ¡ medicamento(s) a travÃ©s de la vÃ­a intravenosa que lo adormecerÃ¡ y relajarÃ¡. Mientras se acuesta sobre mandujano lado mae, el mÃ©dico insertarÃ¡ el endoscopio flexible lubricado.  A medida que el endoscopio avanza con cuidado por el  y [place] mÃ©dico examinarÃ¡ exhaustivamente el estÃ³korey y el revestimiento del esÃ³fago. Es posible que sienta calambres o gases debido al aire que el mÃ©dico estÃ¡ introduciendo en el estÃ³korey. Por lo general, se bert monica [employer] de biopsia (monica pequeÃ±a porciÃ³n de tejido) para un examen microscÃ³ramsey. Si usted tiene un pÃ³lipo, se puede extraer mediante electrocauterizaciÃ³n a travÃ©s del endoscopio. No sentirÃ¡ ninguna sensaciÃ³n ni malestar cuando se realice la biopsia o cuando se extraiga el pÃ³lipo. Mucha gente no recuerda nada del procedimiento debido al efecto de la sedaciÃ³n. DespuÃ©s del procedimiento, probablemente se sienta somnoliento y duerma hans un corto tiempo. Es posible que sienta algo de hinchazÃ³n por el aire insertado hans el procedimiento. Se sentirÃ¡ mÃ¡s cÃ³modo si expulsa isabelle aire. Antes de irse, el mÃ©dico hablarÃ¡ con usted y mandujano adulto responsable sobre los [address] [facility]. La enfermera o el tÃ©cnico gastrointestinal le darÃ¡ instrucciones escritas para que las siga cuando llegue a casa. No podrÃ¡ manejar a casa y debe estar acompaÃ±ado de un adulto responsable, ya que estarÃ¡ somnoliento. No debe reintegrarse al Levern Jose Maria, operar maquinaria ni beber alcohol hans aproximadamente 24 horas despuÃ©s del procedimiento o despuÃ©s de miryam tenido monica noche de descanso reparador. Â¿CuÃ¡les son [employer] complicaciones de monica endoscopia gastrointestinal de las vÃ­as digestivas altas? Aunque la endoscopia es un procedimiento seguro, algunas veces pueden ocurrir complicaciones, aunque son poco frecuentes. Estas pueden incluir monica perforaciÃ³n   (monica punciÃ³n en el esÃ³fago o el [place], que podrÃ­a requerir reparaciÃ³n quirÃºrgica). Cuando se realiza monica biopsia o monica extirpaciÃ³n de pÃ³lipos, se puede producir monica hemorragia interna (sangrado abundante) y, a veces, es necesario realizar monica transfusiÃ³n de columba o la reinserciÃ³n del endoscopio para controlar el sangrado.  AsegÃºrese de hablar con mandujano mÃ©dico sobre cualquier inquietud especÃ­fica que pueda [employer]  sobre el procedimiento.      INFORMACIÃN SOBRE RESTRICCIONES DE MEDICAMENTOS   Aspirina (ASA)  ContinÃºe    CHEPE  ContinÃºe en dosis bajas    Dipiridamol (Persantine)  *Suspenda mandujano ingesta 2 dÃ­as antes   Aggrenox  *Suspenda mandujano ingesta 2 dÃ­as antes   Clopidogrel (Plavix)  *Suspenda mandujano ingesta 5 dÃ­as antes   Prasugrel (Effient)  *Suspenda mandujano ingesta 5 dÃ­as antes   Ticlopidina (Ticlid)  *Suspenda mandujano ingesta 10 dÃ­as antes   Ticagrelor (Brilinta)  *Suspenda mandujano ingesta 3 dÃ­as antes   Cilostazol (Pletal)  *Suspenda mandujano ingesta 2 dÃ­as antes   Anticoagulantes    Warfarina (Coumadin)  *Suspenda mandujano ingesta 5 dÃ­as antes   Heparina  Indicaciones del hospital    Enoxaparina (Lovenox)  *Suspenda mandujano ingesta 1 dÃ­a antes   Dalteparina (Fragmin)  *Suspenda mandujano ingesta 1 dÃ­a antes   Fondaparinux (Arixtra)  *Suspenda mandujano ingesta 2 dÃ­as antes   Rivaroxaban (Xarelto)  *Suspenda mandujano ingesta 2 dÃ­as antes   Apixaban (Eliquis)  *Suspenda mandujano ingesta 2 dÃ­as antes   Edoxaban Arlis Bro)  *Suspenda mandujano ingesta 2 dÃ­as antes   DabigatrÃ¡n (Pradaxa)  *Suspenda mandujano ingesta 1 dÃ­a antes   Todos los suplementos    Vitamina E  Suspenda mandujano ingesta 5 dÃ­as antes    Marcus  Suspenda mandujano ingesta 5 dÃ­as antes    Multivitaminas  Suspenda mandujano ingesta 5 dÃ­as antes    Aceite de pescado  Suspenda mandujano ingesta 5 dÃ­as antes

## 2021-11-19 ENCOUNTER — OUTPATIENT (OUTPATIENT)
Dept: OUTPATIENT SERVICES | Facility: HOSPITAL | Age: 51
LOS: 1 days | End: 2021-11-19
Payer: MEDICAID

## 2021-11-19 DIAGNOSIS — Z91.89 OTHER SPECIFIED PERSONAL RISK FACTORS, NOT ELSEWHERE CLASSIFIED: ICD-10-CM

## 2021-11-19 DIAGNOSIS — I10 ESSENTIAL (PRIMARY) HYPERTENSION: ICD-10-CM

## 2021-11-19 DIAGNOSIS — Z98.890 OTHER SPECIFIED POSTPROCEDURAL STATES: Chronic | ICD-10-CM

## 2021-11-19 PROCEDURE — 93306 TTE W/DOPPLER COMPLETE: CPT | Mod: 26

## 2021-11-19 PROCEDURE — 93306 TTE W/DOPPLER COMPLETE: CPT

## 2021-12-07 ENCOUNTER — APPOINTMENT (OUTPATIENT)
Dept: NEUROSURGERY | Facility: CLINIC | Age: 51
End: 2021-12-07

## 2022-01-20 VITALS
RESPIRATION RATE: 20 BRPM | HEIGHT: 68 IN | HEART RATE: 81 BPM | DIASTOLIC BLOOD PRESSURE: 83 MMHG | WEIGHT: 199.96 LBS | OXYGEN SATURATION: 99 % | SYSTOLIC BLOOD PRESSURE: 147 MMHG

## 2022-01-20 RX ORDER — CHLORHEXIDINE GLUCONATE 213 G/1000ML
1 SOLUTION TOPICAL ONCE
Refills: 0 | Status: DISCONTINUED | OUTPATIENT
Start: 2022-01-21 | End: 2022-02-04

## 2022-01-20 NOTE — H&P PST ADULT - NSICDXPASTMEDICALHX_GEN_ALL_CORE_FT
PAST MEDICAL HISTORY:  Bi inguinal hernia, w/o obst or gangrene, not spcf as recur     BPH (benign prostatic hyperplasia)     Right hydrocele

## 2022-01-20 NOTE — H&P PST ADULT - EJECTION FRACTION >40 %
Subjective     Chief Complaint: Back pain    Patient ID: Maida Deng is a 45 y.o. female seen for consultation today at the request of  Catie Orellana,*    Back Pain   The current episode started 1 to 4 weeks ago. The problem occurs constantly. The problem has been gradually worsening since onset. The pain is present in the lumbar spine, thoracic spine, sacro-iliac and gluteal. The quality of the pain is described as aching. The pain radiates to the right foot and left foot. The pain is at a severity of 10/10. The pain is severe. The pain is the same all the time. The symptoms are aggravated by bending, position, sitting, standing and twisting. Stiffness is present all day. Associated symptoms include headaches, leg pain, numbness, paresthesias, tingling and weakness. Pertinent negatives include no abdominal pain, bladder incontinence, bowel incontinence, chest pain, dysuria, fever, paresis, pelvic pain, perianal numbness or weight loss. Risk factors include sedentary lifestyle and recent trauma. She has tried analgesics and bed rest for the symptoms. The treatment provided no relief.       This is a 45-year-old woman presents to my clinic with chief complaints of severe low back pain and radiating pain into her legs bilaterally, left greater than right.  She has a history of a thoracolumbar fusion that was performed by Dr. Gonzalez in approximately 2001.  She has had chronic back pain since that time.  She has restless leg syndrome, depression, and anxiety.  She is actively abusing tobacco.    She fell about a month ago and experienced the sudden onset of the pain is described above.  She states that the pain is getting progressively worse.    The following portions of the patient's history were reviewed and updated as appropriate: allergies, current medications, past family history, past medical history, past social history, past surgical history and problem list.    Family history: History reviewed. No  pertinent family history.    Social history:   Social History     Social History   • Marital status: Single     Spouse name: N/A   • Number of children: N/A   • Years of education: N/A     Occupational History   • Not on file.     Social History Main Topics   • Smoking status: Current Every Day Smoker     Packs/day: 0.50     Types: Cigarettes   • Smokeless tobacco: Never Used   • Alcohol use Yes      Comment: social   • Drug use: No   • Sexual activity: Defer     Other Topics Concern   • Not on file     Social History Narrative   • No narrative on file       Review of Systems   Constitutional: Positive for activity change, appetite change and fatigue. Negative for chills, diaphoresis, fever, unexpected weight change and weight loss.   HENT: Positive for sore throat and voice change. Negative for congestion, dental problem, drooling, ear discharge, ear pain, facial swelling, hearing loss, mouth sores, nosebleeds, postnasal drip, rhinorrhea, sinus pressure, sneezing, tinnitus and trouble swallowing.    Eyes: Negative for photophobia, pain, discharge, redness, itching and visual disturbance.   Respiratory: Positive for cough, shortness of breath and wheezing. Negative for apnea, choking, chest tightness and stridor.    Cardiovascular: Negative for chest pain, palpitations and leg swelling.   Gastrointestinal: Positive for nausea and vomiting. Negative for abdominal distention, abdominal pain, anal bleeding, blood in stool, bowel incontinence, constipation, diarrhea and rectal pain.   Endocrine: Positive for polydipsia. Negative for cold intolerance, heat intolerance, polyphagia and polyuria.   Genitourinary: Negative for bladder incontinence, decreased urine volume, difficulty urinating, dysuria, enuresis, flank pain, frequency, genital sores, hematuria, pelvic pain and urgency.   Musculoskeletal: Positive for back pain. Negative for arthralgias, gait problem, joint swelling, myalgias, neck pain and neck stiffness.  "  Skin: Negative for color change, pallor, rash and wound.   Allergic/Immunologic: Negative for environmental allergies, food allergies and immunocompromised state.   Neurological: Positive for dizziness, tingling, weakness, numbness, headaches and paresthesias. Negative for tremors, seizures, syncope, facial asymmetry, speech difficulty and light-headedness.   Hematological: Negative for adenopathy. Bruises/bleeds easily.   Psychiatric/Behavioral: Positive for agitation, dysphoric mood and sleep disturbance. Negative for behavioral problems, confusion, decreased concentration, hallucinations, self-injury and suicidal ideas. The patient is nervous/anxious. The patient is not hyperactive.        Objective   Blood pressure 124/84, temperature 99.2 °F (37.3 °C), temperature source Temporal Artery , height 162.6 cm (64\"), weight 97.4 kg (214 lb 12.8 oz).  Body mass index is 36.87 kg/m².    Physical Exam   Constitutional: She is oriented to person, place, and time. She appears well-developed.  Non-toxic appearance.   HENT:   Head: Normocephalic and atraumatic.   Right Ear: Hearing normal.   Left Ear: Hearing normal.   Nose: Nose normal.   Eyes: Conjunctivae, EOM and lids are normal. Pupils are equal, round, and reactive to light.   Neck: Normal range of motion. No JVD present.   Cardiovascular: Normal rate and regular rhythm.    Pulses:       Radial pulses are 2+ on the right side, and 2+ on the left side.   Pulmonary/Chest: Effort normal. No stridor. No respiratory distress. She has no wheezes.   Musculoskeletal:        Lumbar back: She exhibits decreased range of motion, tenderness and pain.        Back:    Neurological: She is alert and oriented to person, place, and time. She displays normal reflexes. No cranial nerve deficit. She exhibits normal muscle tone. She displays a negative Romberg sign. Gait abnormal. Coordination normal. GCS eye subscore is 4. GCS verbal subscore is 5. GCS motor subscore is 6.   Reflex " Scores:       Patellar reflexes are 3+ on the right side and 3+ on the left side.       Achilles reflexes are 3+ on the right side and 3+ on the left side.  She has an antalgic gait.       Skin: Skin is warm and dry. No rash noted. No erythema.   Psychiatric: She has a normal mood and affect. Her behavior is normal. Judgment and thought content normal.   Nursing note and vitals reviewed.        Assessment/Plan     Independent Review of Radiographic Studies:      Available for my review is a MRI of the thoracic and lumbar spine.  Unfortunately, the patient did not bring her CD with her, so I was only able to review a compressed version of her studies which was made available by the [facility] over the Internet.  This MRI is not suitable for diagnostic purposes.  Additionally, the patient has a significant amount of susceptibility artifact secondary to the pedicle screws and her thoracolumbar spine.  The sagittal sections did not display any evidence of T2/FLAIR FLAIR signal prolongation within the vertebral bodies.  I do not see any evidence of an acute compression fracture.  The portions of the spinal canal that I'm able to visualize on the MRI did not demonstrate any focal narrowing, or neural element compression.    Medical Decision Making:      This is a 45 rolled woman with a thoracolumbar fusion who has had a fall resulting in progressive back pain and gait instability.  She does not have any focal deficits on her neurological examination.  The available imaging is insufficient for making any sort of the diagnosis.  I have requested a CT thoracic and CT lumbar as well as scoliosis films of her spine.    I gave her a one-time prescription for some Norco for temporary pain relief.  I like to follow up with her after the aforementioned imaging studies of been completed.    Maida was seen today for back pain.    Diagnoses and all orders for this visit:    Acute bilateral low back pain with bilateral  yes sciatica  -     CT Lumbar Spine Without Contrast  -     CT Thoracic Spine Without Contrast  -     XR spine scoliosis 2 or 3 views    Status post lumbar spinal fusion    Other orders  -     HYDROcodone-acetaminophen (NORCO) 7.5-325 MG per tablet; Take 1 tablet by mouth Every 6 (Six) Hours As Needed for Moderate Pain .        No Follow-up on file.           This document signed by TONE Avalos MD April 25, 2018 1:25 PM

## 2022-01-20 NOTE — H&P PST ADULT - BACK
How Severe Is Your Skin Lesion?: mild
Has Your Skin Lesion Been Treated?: not been treated
Is This A New Presentation, Or A Follow-Up?: Skin Lesion
detailed exam

## 2022-01-20 NOTE — H&P PST ADULT - ASSESSMENT
A 51 year old male with a PMH of HLD, cervical disc disease, and abnormal CTA demonstrating moderate sized noncalcified plaque in the proximal LAD resulting in a stenosis of 70% presenting for a [facility] with Dr Mando Saul this am    PRE-PROCEDURE ASSESSMENT  -NPO after midnight confirmed  -IV insert  -Patient seen and examined  -Labs reviewed  -Pre-procedure teaching completed with patient   -Consent obtained by Interventional Cardiologist  -Questions answered

## 2022-01-20 NOTE — H&P PST ADULT - NSICDXFAMILYHX_GEN_ALL_CORE_FT
FAMILY HISTORY:  Father  Still living? Unknown  FH: testicular cancer, Age at diagnosis: Age Unknown    Mother  Still living? Yes, Estimated age: Age Unknown  FH: type 2 diabetes, Age at diagnosis: Age Unknown

## 2022-01-20 NOTE — H&P PST ADULT - HISTORY OF PRESENT ILLNESS
Narrative: This is a 51 year old male with a PMH of HLD, cervical disc disease, and abnormal CTA demonstrating moderate sized noncalcified plaque in the proximal LAD resulting in a stenosis of 70% presenting for a [facility] with Dr Mando Saul this am    MAL  ASA  GFR    Bleeding Risk Assessment    Symptoms:        Angina (Class):        Ischemic Symptoms:     Heart Failure: Grade I diastolic dysfunction.       Systolic/Diastolic/Combined:        NYHA Class (within 2 weeks):     Assessment of LVEF:       EF: 60-65%       Assessed by: echo       Date: 11/19/21    Prior Cardiac Interventions: None       PCI's:        CABG:   Coronary Calcium Score of 0-moderate size noncalcified plaque in the proximal LAD resulting in a stenosis in the range of 70%.  Noninvasive Testing:   Stress Test: Date:        Protocol:        Duration of Exercise:        Symptoms:        EKG Changes:        DTS:        Myocardial Imaging:        Risk Assessment:     Echo: Mildly increased LV wall thickness, normal LA size, normal RA size, no evidence of pericardial effusion EF 60-65%, trace mitral regurg  EKG-NSR HR-78  Antianginal Therapies:        Beta Blockers:         Calcium Channel Blockers:        Long Acting Nitrates:        Ranexa:     Associated Risk Factors:        Cerebrovascular Disease: N/A       Chronic Lung Disease: N/A       Peripheral Arterial Disease: N/A       Chronic Kidney Disease (if yes, what is GFR): N/A       Uncontrolled Diabetes (if yes, what is HgbA1C or FBS): N/A       Poorly Controlled Hypertension (if yes, what is SBP): N/A       Morbid Obesity (if yes, what is BMI): N/A       History of Recent Ventricular Arrhythmia: N/A       Inability to Ambulate Safely: N/A       Need for Therapeutic Anticoagulation: N/A       Antiplatelet or Contrast Allergy: N/A Narrative: This is a 51 year old male with a PMH of HLD, HTN, cervical disc disease, and abnormal CTA demonstrating moderate sized noncalcified plaque in the proximal LAD resulting in a stenosis of 70% presenting for a [facility] with Dr Mando Saul this am PSHx bilateral carpel tunnel surgery, bilateral inguinal surgery in May 2021. PMHx includes lumbar and cervical disc disease.     ASA 2  Mallampati 2  GFR  Creat  Bleeding Risk  COVID19 - Negative 1/17/2021    Bleeding Risk Assessment    Symptoms:        Angina (Class): 3       Ischemic Symptoms: Chest pressure and ache to the left chest    Heart Failure: Grade I diastolic dysfunction.       Systolic/Diastolic/Combined:        NYHA Class (within 2 weeks):     Assessment of LVEF:       EF: 60-65%       Assessed by: echo       Date: 11/19/21    Prior Cardiac Interventions: None           Coronary Calcium Score of 0-moderate size noncalcified plaque in the proximal LAD resulting in a stenosis in the range of 70%.  Noninvasive Testing:   Stress Test: Date:  deferred secondary HTN        Echo: Mildly increased LV wall thickness, normal LA size, normal RA size, no evidence of pericardial effusion EF 60-65%, trace mitral regurg  EKG-NSR HR-78    Antianginal Therapies: None       Beta Blockers:         Calcium Channel Blockers:        Long Acting Nitrates:        Ranexa:     Associated Risk Factors: NONE       Cerebrovascular Disease: N/A       Chronic Lung Disease: N/A       Peripheral Arterial Disease: N/A       Chronic Kidney Disease (if yes, what is GFR): N/A       Uncontrolled Diabetes (if yes, what is HgbA1C or FBS): N/A       Poorly Controlled Hypertension (if yes, what is SBP): N/A       Morbid Obesity (if yes, what is BMI): N/A       History of Recent Ventricular Arrhythmia: N/A       Inability to Ambulate Safely: N/A       Need for Therapeutic Anticoagulation: N/A       Antiplatelet or Contrast Allergy: N/A Narrative: This is a 51 year old male with a PMH of HLD, HTN, cervical disc disease, and abnormal CTA demonstrating moderate sized noncalcified plaque in the proximal LAD resulting in a stenosis of 70% presenting for a [facility] with Dr Mando Saul this am PSHx bilateral carpel tunnel surgery, bilateral inguinal surgery in May 2021. PMHx includes lumbar and cervical disc disease.     ASA 2  Mallampati 2  GFR 97  Creat 0.93  Bleeding Risk 1.1%  COVID19 - Negative 1/17/2021    Bleeding Risk Assessment    Symptoms:        Angina (Class): 3       Ischemic Symptoms: Chest pressure and ache to the left chest    Heart Failure: Grade I diastolic dysfunction.       Systolic/Diastolic/Combined:        NYHA Class (within 2 weeks):     Assessment of LVEF:       EF: 60-65%       Assessed by: echo       Date: 11/19/21    Prior Cardiac Interventions: None           Coronary Calcium Score of 0-moderate size noncalcified plaque in the proximal LAD resulting in a stenosis in the range of 70%.  Noninvasive Testing:   Stress Test: Date:  deferred secondary HTN        Echo: Mildly increased LV wall thickness, normal LA size, normal RA size, no evidence of pericardial effusion EF 60-65%, trace mitral regurg  EKG-NSR HR-78    Antianginal Therapies: None       Beta Blockers:         Calcium Channel Blockers:        Long Acting Nitrates:        Ranexa:     Associated Risk Factors: NONE       Cerebrovascular Disease: N/A       Chronic Lung Disease: N/A       Peripheral Arterial Disease: N/A       Chronic Kidney Disease (if yes, what is GFR): N/A       Uncontrolled Diabetes (if yes, what is HgbA1C or FBS): N/A       Poorly Controlled Hypertension (if yes, what is SBP): N/A       Morbid Obesity (if yes, what is BMI): N/A       History of Recent Ventricular Arrhythmia: N/A       Inability to Ambulate Safely: N/A       Need for Therapeutic Anticoagulation: N/A       Antiplatelet or Contrast Allergy: N/A

## 2022-01-20 NOTE — H&P PST ADULT - NEGATIVE NEUROLOGICAL SYMPTOMS
no transient paralysis/no weakness/no generalized seizures/no focal seizures/no syncope/no vertigo/no difficulty walking/no headache

## 2022-01-21 ENCOUNTER — TRANSCRIPTION ENCOUNTER (OUTPATIENT)
Age: 52
End: 2022-01-21

## 2022-01-21 ENCOUNTER — OUTPATIENT (OUTPATIENT)
Dept: OUTPATIENT SERVICES | Facility: HOSPITAL | Age: 52
LOS: 1 days | End: 2022-01-21
Payer: MEDICAID

## 2022-01-21 VITALS
RESPIRATION RATE: 16 BRPM | SYSTOLIC BLOOD PRESSURE: 129 MMHG | HEART RATE: 78 BPM | OXYGEN SATURATION: 98 % | DIASTOLIC BLOOD PRESSURE: 81 MMHG

## 2022-01-21 DIAGNOSIS — I25.10 ATHEROSCLEROTIC HEART DISEASE OF NATIVE CORONARY ARTERY WITHOUT ANGINA PECTORIS: ICD-10-CM

## 2022-01-21 DIAGNOSIS — I25.83 CORONARY ATHEROSCLEROSIS DUE TO LIPID RICH PLAQUE: ICD-10-CM

## 2022-01-21 DIAGNOSIS — Z98.890 OTHER SPECIFIED POSTPROCEDURAL STATES: Chronic | ICD-10-CM

## 2022-01-21 LAB
ANION GAP SERPL CALC-SCNC: 11 MMOL/L — SIGNIFICANT CHANGE UP (ref 5–17)
BASOPHILS # BLD AUTO: 0.03 K/UL — SIGNIFICANT CHANGE UP (ref 0–0.2)
BASOPHILS NFR BLD AUTO: 0.5 % — SIGNIFICANT CHANGE UP (ref 0–2)
BUN SERPL-MCNC: 20.6 MG/DL — HIGH (ref 8–20)
CALCIUM SERPL-MCNC: 9.4 MG/DL — SIGNIFICANT CHANGE UP (ref 8.6–10.2)
CHLORIDE SERPL-SCNC: 104 MMOL/L — SIGNIFICANT CHANGE UP (ref 98–107)
CO2 SERPL-SCNC: 26 MMOL/L — SIGNIFICANT CHANGE UP (ref 22–29)
CREAT SERPL-MCNC: 0.93 MG/DL — SIGNIFICANT CHANGE UP (ref 0.5–1.3)
EOSINOPHIL # BLD AUTO: 0.21 K/UL — SIGNIFICANT CHANGE UP (ref 0–0.5)
EOSINOPHIL NFR BLD AUTO: 3.5 % — SIGNIFICANT CHANGE UP (ref 0–6)
GLUCOSE SERPL-MCNC: 97 MG/DL — SIGNIFICANT CHANGE UP (ref 70–99)
HCT VFR BLD CALC: 46.5 % — SIGNIFICANT CHANGE UP (ref 39–50)
HGB BLD-MCNC: 16.5 G/DL — SIGNIFICANT CHANGE UP (ref 13–17)
IMM GRANULOCYTES NFR BLD AUTO: 0.3 % — SIGNIFICANT CHANGE UP (ref 0–1.5)
LYMPHOCYTES # BLD AUTO: 1.33 K/UL — SIGNIFICANT CHANGE UP (ref 1–3.3)
LYMPHOCYTES # BLD AUTO: 21.9 % — SIGNIFICANT CHANGE UP (ref 13–44)
MCHC RBC-ENTMCNC: 31.6 PG — SIGNIFICANT CHANGE UP (ref 27–34)
MCHC RBC-ENTMCNC: 35.5 GM/DL — SIGNIFICANT CHANGE UP (ref 32–36)
MCV RBC AUTO: 89.1 FL — SIGNIFICANT CHANGE UP (ref 80–100)
MONOCYTES # BLD AUTO: 0.66 K/UL — SIGNIFICANT CHANGE UP (ref 0–0.9)
MONOCYTES NFR BLD AUTO: 10.9 % — SIGNIFICANT CHANGE UP (ref 2–14)
NEUTROPHILS # BLD AUTO: 3.82 K/UL — SIGNIFICANT CHANGE UP (ref 1.8–7.4)
NEUTROPHILS NFR BLD AUTO: 62.9 % — SIGNIFICANT CHANGE UP (ref 43–77)
PLATELET # BLD AUTO: 211 K/UL — SIGNIFICANT CHANGE UP (ref 150–400)
POTASSIUM SERPL-MCNC: 4.3 MMOL/L — SIGNIFICANT CHANGE UP (ref 3.5–5.3)
POTASSIUM SERPL-SCNC: 4.3 MMOL/L — SIGNIFICANT CHANGE UP (ref 3.5–5.3)
RBC # BLD: 5.22 M/UL — SIGNIFICANT CHANGE UP (ref 4.2–5.8)
RBC # FLD: 11.8 % — SIGNIFICANT CHANGE UP (ref 10.3–14.5)
SODIUM SERPL-SCNC: 141 MMOL/L — SIGNIFICANT CHANGE UP (ref 135–145)
WBC # BLD: 6.07 K/UL — SIGNIFICANT CHANGE UP (ref 3.8–10.5)
WBC # FLD AUTO: 6.07 K/UL — SIGNIFICANT CHANGE UP (ref 3.8–10.5)

## 2022-01-21 PROCEDURE — 99152 MOD SED SAME PHYS/QHP 5/>YRS: CPT

## 2022-01-21 PROCEDURE — 80048 BASIC METABOLIC PNL TOTAL CA: CPT

## 2022-01-21 PROCEDURE — 93458 L HRT ARTERY/VENTRICLE ANGIO: CPT

## 2022-01-21 PROCEDURE — 85025 COMPLETE CBC W/AUTO DIFF WBC: CPT

## 2022-01-21 PROCEDURE — C1894: CPT

## 2022-01-21 PROCEDURE — 99153 MOD SED SAME PHYS/QHP EA: CPT

## 2022-01-21 PROCEDURE — 36415 COLL VENOUS BLD VENIPUNCTURE: CPT

## 2022-01-21 PROCEDURE — C1887: CPT

## 2022-01-21 PROCEDURE — C1769: CPT

## 2022-01-21 PROCEDURE — 93458 L HRT ARTERY/VENTRICLE ANGIO: CPT | Mod: 26

## 2022-01-21 RX ORDER — ATORVASTATIN CALCIUM 80 MG/1
10 TABLET, FILM COATED ORAL AT BEDTIME
Refills: 0 | Status: DISCONTINUED | OUTPATIENT
Start: 2022-01-21 | End: 2022-02-04

## 2022-01-21 RX ORDER — LOSARTAN POTASSIUM 100 MG/1
25 TABLET, FILM COATED ORAL DAILY
Refills: 0 | Status: DISCONTINUED | OUTPATIENT
Start: 2022-01-21 | End: 2022-02-04

## 2022-01-21 RX ORDER — ASPIRIN/CALCIUM CARB/MAGNESIUM 324 MG
1 TABLET ORAL
Qty: 0 | Refills: 0 | DISCHARGE

## 2022-01-21 RX ORDER — ATORVASTATIN CALCIUM 80 MG/1
1 TABLET, FILM COATED ORAL
Qty: 90 | Refills: 0
Start: 2022-01-21 | End: 2022-04-20

## 2022-01-21 RX ORDER — LOSARTAN POTASSIUM 100 MG/1
1 TABLET, FILM COATED ORAL
Qty: 90 | Refills: 0
Start: 2022-01-21 | End: 2022-04-20

## 2022-01-21 NOTE — DISCHARGE NOTE PROVIDER - NSDCMRMEDTOKEN_GEN_ALL_CORE_FT
aspirin 81 mg oral tablet, chewable: 1 tab(s) orally once a day  atorvastatin 10 mg oral tablet: 1 tab(s) orally once a day (at bedtime)  losartan 25 mg oral tablet: 1 tab(s) orally once a day

## 2022-01-21 NOTE — PROGRESS NOTE ADULT - SUBJECTIVE AND OBJECTIVE BOX
Nurse Practitioner Progress note:   s/p LHC revealed mild CAD with 30% blockage in the pLAD, normal coronaries otherwise, normal LVEDP.       Patient feels well.  Denies chest pain, shortness of breath, dizziness or palpitations at this time    Patient A&O x 3  Lungs CTA  S1S2 no MRG  Right radial hemoband in place.  Procedure site CDI, no bleeding, no hematoma, able to move all digits with capillary refill < 3 seconds, fingers warm      T(C): 36.4 (01-21-22 @ 09:47), Max: 36.4 (01-21-22 @ 09:47)  HR: 73 (01-21-22 @ 12:40) (72 - 81)  BP: 118/79 (01-21-22 @ 12:40) (113/80 - 147/83)  RR: 16 (01-21-22 @ 12:40) (16 - 16)  SpO2: 97% (01-21-22 @ 12:40) (95% - 98%)    CBC Full  -  ( 21 Jan 2022 10:01 )  WBC Count : 6.07 K/uL  RBC Count : 5.22 M/uL  Hemoglobin : 16.5 g/dL  Hematocrit : 46.5 %  Platelet Count - Automated : 211 K/uL  Mean Cell Volume : 89.1 fl  Mean Cell Hemoglobin : 31.6 pg  Mean Cell Hemoglobin Concentration : 35.5 gm/dL  Auto Neutrophil # : 3.82 K/uL  Auto Lymphocyte # : 1.33 K/uL  Auto Monocyte # : 0.66 K/uL  Auto Eosinophil # : 0.21 K/uL  Auto Basophil # : 0.03 K/uL  Auto Neutrophil % : 62.9 %  Auto Lymphocyte % : 21.9 %  Auto Monocyte % : 10.9 %  Auto Eosinophil % : 3.5 %  Auto Basophil % : 0.5 %    01-21    141  |  104  |  20.6<H>  ----------------------------<  97  4.3   |  26.0  |  0.93    Ca    9.4      21 Jan 2022 10:01      MEDICATIONS  (STANDING):  atorvastatin 10 milliGRAM(s) Oral at bedtime  chlorhexidine 4% Liquid 1 Application(s) Topical Once  losartan 25 milliGRAM(s) Oral daily            HPI:  Narrative: This is a 51 year old male with a PMH of HLD, HTN, cervical disc disease, and abnormal CTA demonstrating moderate sized noncalcified plaque in the proximal LAD resulting in a stenosis of 70% presenting for a [facility] with Dr Mando Saul this am PSHx bilateral carpel tunnel surgery, bilateral inguinal surgery in May 2021. PMHx includes lumbar and cervical disc disease.       now s/p [facility]        ASSESSMENT/PLAN:    Abnormal CTA  -Mild coronary artery disease  -Recover patient for 2.5 hours  -Resume cardiac diet  -Ambulate patient p 3 hours  -Discharge to home with escort if procedure site and patient remain stable after patient eats, ambulates and toilets  -Start Losartan 25 mg po daily  -Start Lipitor 10 mg po daily  -Plan of care discussed with patient, family and MD  -Follow-up with attending cardiologist Dr Sandra  within 1 month  -Discussed therapeutic lifestyle changes to reduce risk factors such as following a cardiac diet, weight loss, maintaining a healthy weight, exercise, smoking cessation, medication compliance, and regular follow-up with MD to know your numbers (BP, cholesterol, weight, and glucose). Discussed significance of hypertension and hyperlipidemia risks and benefits of taking medicine to control them, patient expressed understanding and willingness to start new medications.

## 2022-01-21 NOTE — DISCHARGE NOTE PROVIDER - NSDCCPCAREPLAN_GEN_ALL_CORE_FT
PRINCIPAL DISCHARGE DIAGNOSIS  Diagnosis: Mild CAD  Assessment and Plan of Treatment: 30% occlusion of the pLAD

## 2022-01-21 NOTE — DISCHARGE NOTE NURSING/CASE MANAGEMENT/SOCIAL WORK - PATIENT PORTAL LINK FT
You can access the FollowMyHealth Patient Portal offered by Health system by registering at the following website: [email or web address]. By joining [employer]’s FollowMyHealth portal, you will also be able to view your health information using other applications (apps) compatible with our system.

## 2022-01-21 NOTE — DISCHARGE NOTE PROVIDER - NSDCQMCOGNITION_NEU_ALL_CORE
Exam under anesthesia revealed normal sized anteverted uterus, normal appearing external genitalia, vaginal mucosa and cervix. Hysteroscopy showed uterine fundus, bilateral ostia, endometrial polyp at the 10 o'clock position and large fibroid at the 2 o'clock position.
No difficulties

## 2022-01-21 NOTE — DISCHARGE NOTE PROVIDER - HOSPITAL COURSE
Narrative: This is a 51 year old male with a PMH of HLD, HTN, cervical disc disease, and abnormal CTA demonstrating moderate sized noncalcified plaque in the proximal LAD resulting in a stenosis of 70% presenting for a [facility] with Dr Mando Saul this am PSHx bilateral carpel tunnel surgery, bilateral inguinal surgery in May 2021. PMHx includes lumbar and cervical disc disease.     Patient now sp LHC revealing 30% occlusion of the prox LAD, otherwise normal coronaries. EF normal. LVEDP normal.    Plan  Start Losartan 25 mg po daily  Start Lipitor 10 mg po daily  Follow up with Dr Sandra in 2-4 weeks

## 2022-01-21 NOTE — ASU PATIENT PROFILE, ADULT - FALL HARM RISK - UNIVERSAL INTERVENTIONS
Bed in lowest position, wheels locked, appropriate side rails in place/Call bell, personal items and telephone in reach/Instruct patient to call for assistance before getting out of bed or chair/Non-slip footwear when patient is out of bed/[place] to call system/Physically safe environment - no spills, clutter or unnecessary equipment/Purposeful Proactive Rounding/Room/bathroom lighting operational, light cord in reach

## 2022-01-21 NOTE — DISCHARGE NOTE PROVIDER - CARE PROVIDER_API CALL
Mally Sandra (DO)  Cardiology; Internal Medicine  [address] [place] [address]  Phone: [phone]  Fax: [phone]  Follow Up Time: 1 month

## 2022-01-21 NOTE — DISCHARGE NOTE NURSING/CASE MANAGEMENT/SOCIAL WORK - NSDCPEFALRISK_GEN_ALL_CORE
For information on Fall & Injury Prevention, visit: [email or web address] OR  [email or web address] OR  https://www.cdc.gov/steadi/patient.html

## 2022-07-11 ENCOUNTER — EMERGENCY (EMERGENCY)
Facility: HOSPITAL | Age: 52
LOS: 1 days | Discharge: HOME | End: 2022-07-11
Payer: COMMERCIAL

## 2022-07-11 ENCOUNTER — APPOINTMENT (OUTPATIENT)
Dept: SURGERY | Facility: CLINIC | Age: 52
End: 2022-07-11

## 2022-07-11 VITALS
HEART RATE: 79 BPM | SYSTOLIC BLOOD PRESSURE: 156 MMHG | RESPIRATION RATE: 18 BRPM | DIASTOLIC BLOOD PRESSURE: 80 MMHG | TEMPERATURE: 98 F | WEIGHT: 205.03 LBS | HEIGHT: 68 IN | OXYGEN SATURATION: 98 %

## 2022-07-11 VITALS
RESPIRATION RATE: 14 BRPM | BODY MASS INDEX: 31.83 KG/M2 | WEIGHT: 210 LBS | OXYGEN SATURATION: 97 % | TEMPERATURE: 97.5 F | SYSTOLIC BLOOD PRESSURE: 137 MMHG | HEART RATE: 82 BPM | DIASTOLIC BLOOD PRESSURE: 88 MMHG | HEIGHT: 68 IN

## 2022-07-11 DIAGNOSIS — Z98.890 OTHER SPECIFIED POSTPROCEDURAL STATES: Chronic | ICD-10-CM

## 2022-07-11 PROCEDURE — 99283 EMERGENCY DEPT VISIT LOW MDM: CPT

## 2022-07-11 PROCEDURE — 99282 EMERGENCY DEPT VISIT SF MDM: CPT

## 2022-07-11 PROCEDURE — 99213 OFFICE O/P EST LOW 20 MIN: CPT

## 2022-07-11 RX ORDER — TETANUS TOXOID, REDUCED DIPHTHERIA TOXOID AND ACELLULAR PERTUSSIS VACCINE, ADSORBED 5; 2.5; 8; 8; 2.5 [IU]/.5ML; [IU]/.5ML; UG/.5ML; UG/.5ML; UG/.5ML
0.5 SUSPENSION INTRAMUSCULAR ONCE
Refills: 0 | Status: DISCONTINUED | OUTPATIENT
Start: 2022-07-11 | End: 2022-07-16

## 2022-07-11 NOTE — PHYSICAL EXAM
[JVD] : no jugular venous distention  [No Rash or Lesion] : No rash or lesion [Purpura] : no purpura  [Petechiae] : no petechiae [Skin Ulcer] : no ulcer [Skin Induration] : no induration [Alert] : alert [Oriented to Person] : oriented to person [Oriented to Place] : oriented to place [Oriented to Time] : oriented to time [Calm] : calm [de-identified] : NC/AT PERRL EOMI no scleral icterus  [de-identified] : non toxic, in no acute distress  [de-identified] : trachea midline, no gross mass  [de-identified] : no audible wheezing or stridor  [de-identified] : softly distended, no localizing tenderness, no guarding, no rebound, no masses  [de-identified] : phallus normal, there is a right hydrocele with no associated tenderness  [de-identified] : FROM of all extremities with no gross deformity or angulation, there remains no tenderness, no umbilical no recurrent inguinal hernias and no seroma or hematoma [de-identified] : mood is calm

## 2022-07-11 NOTE — ASSESSMENT
[FreeTextEntry1] : The patient is a 52 year old male with mild intermittent right groin pain following a robotic bilateral inguinal hernia repair 14 months ago.  The patient reports that the pain was present prior to surgery, but now seems to be intermittent when his weight increases. The patient has a right hydrocele that we will continue to manage conservatively as he is having no significant pain associated with it.  The patient at this point will try to maintain a stable weight and will follow up as needed should any problems or issues arise.  A total of 20 minutes was spent coordinating the patient's care.

## 2022-07-11 NOTE — HISTORY OF PRESENT ILLNESS
[de-identified] : The patient comes to the office with complaints of intermittent right groin discomfort that he states is worse when his weight increases.  He states that the pain is very intermittent and short lived. He denies the need to take pain medication and states it often does not affect his level of activity. He reports that the swelling in the right testicle is slightly worse, but he has no pain.

## 2022-08-14 NOTE — ED PROVIDER NOTE - PATIENT PORTAL LINK FT
You can access the FollowMyHealth Patient Portal offered by [facility] by registering at the following website: [email or web address]. By joining [email or web address]’s FollowMyHealth portal, you will also be able to view your health information using other applications (apps) compatible with our system.

## 2022-08-14 NOTE — ED PROVIDER NOTE - OBJECTIVE STATEMENT
51 y/o M c/o screw tip stuck in right knee.  Patient was working with a screw gun and it penetrated the skin on his knee.  Not up to date on tetanus.

## 2022-08-14 NOTE — ED PROVIDER NOTE - NS ED ATTENDING STATEMENT MOD
This was a shared visit with the MAE. I reviewed and verified the documentation and independently performed the documented:

## 2023-12-05 ENCOUNTER — APPOINTMENT (OUTPATIENT)
Dept: ORTHOPEDIC SURGERY | Facility: CLINIC | Age: 53
End: 2023-12-05
Payer: MEDICAID

## 2023-12-05 VITALS — BODY MASS INDEX: 31.83 KG/M2 | WEIGHT: 210 LBS | HEIGHT: 68 IN

## 2023-12-05 DIAGNOSIS — M54.16 RADICULOPATHY, LUMBAR REGION: ICD-10-CM

## 2023-12-05 DIAGNOSIS — M54.41 LUMBAGO WITH SCIATICA, RIGHT SIDE: ICD-10-CM

## 2023-12-05 DIAGNOSIS — M51.36 OTHER INTERVERTEBRAL DISC DEGENERATION, LUMBAR REGION: ICD-10-CM

## 2023-12-05 PROCEDURE — 72170 X-RAY EXAM OF PELVIS: CPT

## 2023-12-05 PROCEDURE — 72100 X-RAY EXAM L-S SPINE 2/3 VWS: CPT

## 2023-12-05 PROCEDURE — 99204 OFFICE O/P NEW MOD 45 MIN: CPT

## 2023-12-05 RX ORDER — CYCLOBENZAPRINE HYDROCHLORIDE 5 MG/1
5 TABLET, FILM COATED ORAL
Qty: 60 | Refills: 0 | Status: ACTIVE | COMMUNITY
Start: 2023-12-05 | End: 1900-01-01

## 2023-12-05 RX ORDER — METHYLPREDNISOLONE 4 MG/1
4 TABLET ORAL
Qty: 1 | Refills: 0 | Status: ACTIVE | COMMUNITY
Start: 2023-12-05 | End: 1900-01-01

## 2024-01-09 ENCOUNTER — APPOINTMENT (OUTPATIENT)
Dept: ORTHOPEDIC SURGERY | Facility: CLINIC | Age: 54
End: 2024-01-09

## 2024-05-12 NOTE — ASU PREOP CHECKLIST - WEIGHT IN LBS
no concerns
Procedure (L ankle closed reduction/splinting): after verbal consent obtained, 10cc 1% Lidocaine injected into L ankle joint. Closed reduction then performed and patient placed in well padded trilam splint. Patient tolerated procedure well. Neurovascular exam unchanged. Tolerated procedure well.
200.1

## 2024-05-20 ENCOUNTER — APPOINTMENT (OUTPATIENT)
Dept: SURGERY | Facility: CLINIC | Age: 54
End: 2024-05-20
Payer: MEDICAID

## 2024-05-20 VITALS
BODY MASS INDEX: 33.34 KG/M2 | RESPIRATION RATE: 16 BRPM | WEIGHT: 220 LBS | HEART RATE: 74 BPM | DIASTOLIC BLOOD PRESSURE: 90 MMHG | SYSTOLIC BLOOD PRESSURE: 133 MMHG | HEIGHT: 68 IN | TEMPERATURE: 98.7 F | OXYGEN SATURATION: 97 %

## 2024-05-20 DIAGNOSIS — Z87.19 OTHER SPECIFIED POSTPROCEDURAL STATES: ICD-10-CM

## 2024-05-20 DIAGNOSIS — Z98.890 OTHER SPECIFIED POSTPROCEDURAL STATES: ICD-10-CM

## 2024-05-20 DIAGNOSIS — R10.31 RIGHT LOWER QUADRANT PAIN: ICD-10-CM

## 2024-05-20 DIAGNOSIS — N43.3 HYDROCELE, UNSPECIFIED: ICD-10-CM

## 2024-05-20 PROCEDURE — 99213 OFFICE O/P EST LOW 20 MIN: CPT

## 2024-05-20 NOTE — PHYSICAL EXAM
[JVD] : no jugular venous distention  [No Rash or Lesion] : No rash or lesion [Purpura] : no purpura  [Petechiae] : no petechiae [Skin Ulcer] : no ulcer [Skin Induration] : no induration [Alert] : alert [Oriented to Person] : oriented to person [Oriented to Place] : oriented to place [Oriented to Time] : oriented to time [Calm] : calm [de-identified] : non toxic, in no acute distress  [de-identified] : NC/AT PERRL EOMI no scleral icterus  [de-identified] : trachea midline, no gross mass  [de-identified] : no audible wheezing or stridor  [de-identified] : softly distended, no localizing tenderness, no guarding, no rebound, no masses  [de-identified] : phallus normal, there is a right hydrocele with no associated tenderness that may be slightly larger than prior exam  [de-identified] : FROM of all extremities with no gross deformity or angulation, there remains no tenderness, no umbilical no recurrent inguinal hernias and no seroma or hematoma [de-identified] : mood is calm

## 2024-05-20 NOTE — HISTORY OF PRESENT ILLNESS
[de-identified] : The patient returns to the office for a recheck of his hernia repair. He states that he has intermittent mild discomfort, no pain from time to time. He also notes that the right hydrocele appears to be a bit larger than before.

## 2024-05-20 NOTE — ASSESSMENT
[FreeTextEntry1] : The patient is a 53-year-old male with a right hydrocele and intermittent right groin discomfort following a robotic bilateral inguinal hernia repair three years ago. He has no signs of recurrence. He has been advised to see Urology in consultation for the hydrocele. A total of 20 minutes was spent coordinating the patient's care.

## 2024-06-12 NOTE — ASU DISCHARGE PLAN (ADULT/PEDIATRIC) - DIET/FLUID RESTRICTION
No change Thanks for letting us take care of you today!  It is our goal to give you courteous care and to keep you comfortable and informed, if you have any questions before you leave I will be happy to try and answer them.    Here is some advice after your visit:      Your visit in the emergency department is NOT definitive care - please follow-up with your primary care doctor and/or specialist within 1-2 days.  Please return if you have any worsening in your condition or if you have any other concerns.    If you had radiology exams like an XRAY or CT in the emergency Department the interpreation on them may be preliminary - there may be less time sensitive findings on the reports please obtain these reports within 24 hours from the hospital or by using your out on your mobile phone to access records.  Bring these to your primary care doctor and/or specialist for further review of incidental findings.    Please review any LAB WORK from your visit today with your primary care physician.    If you were prescribed OPIATE PAIN MEDICATION - please understand of these medications can be addictive, you may fill less of the prescription was written for, you do not have to take the full prescription.  You may discard what you do not use.  Please seek help if you feel you are having problems with addiction.  Do not drive or operate heavy machinery if you are taking sedating medications.  Do not mix these medications with alcohol.      If you had a SPLINT placed in the emergency department if you have severe pain numbness tingling or discoloration of year digits please remove the splint and return to the emergency department for further evaluation as this may represent a sign of compromise to the nerves or blood vessels due to swelling.    If you had SUTURES in the emergency department please have them removed in the prescribed time frame typically within 7-14 days.  You may shower but please do not bathe or swim.  Keep the wounds  clean and dry and covered with a clean dressing.  Please return if he have any signs of infection like redness or drainage or pain at the suture site.    Please take the full course of  any ANTIBIOTICS you were prescribed - incomplete courses of antibiotics can cause resistance to antibiotics in the future which will make it difficult to treat any infections you may have.

## 2024-08-08 NOTE — BRIEF OPERATIVE NOTE - NSICDXBRIEFPROCEDURE_GEN_ALL_CORE_FT
"Chief Complaint   Patient presents with    Port Draw     Labs drawn from port by rn.  VS taken.     Port accessed with 20 gauge 3/4\" Power needle and labs drawn by rn.  Port flushed with NS and citrate.  Pt tolerated well.  VS taken.  Pt checked in for next appt.    Maricruz Marie RN      " PROCEDURES:  Robot-assisted repair of inguinal hernia 05-May-2021 17:18:13 bilateral Alina Preston
